# Patient Record
Sex: FEMALE | Race: WHITE | NOT HISPANIC OR LATINO | Employment: OTHER | URBAN - METROPOLITAN AREA
[De-identification: names, ages, dates, MRNs, and addresses within clinical notes are randomized per-mention and may not be internally consistent; named-entity substitution may affect disease eponyms.]

---

## 2023-04-04 ENCOUNTER — TELEPHONE (OUTPATIENT)
Dept: PRIMARY CARE | Facility: CLINIC | Age: 73
End: 2023-04-04
Payer: MEDICARE

## 2023-04-04 NOTE — TELEPHONE ENCOUNTER
Patient was instructed to get antibiotics before going to the dentist. Now  Dr. Rivera tells her she does not need antibiotics for just a cleaning. Please advise.

## 2023-04-06 ENCOUNTER — TELEPHONE (OUTPATIENT)
Dept: PRIMARY CARE | Facility: CLINIC | Age: 73
End: 2023-04-06
Payer: MEDICARE

## 2023-04-06 DIAGNOSIS — E11.65 TYPE 2 DIABETES MELLITUS WITH HYPERGLYCEMIA, WITHOUT LONG-TERM CURRENT USE OF INSULIN (MULTI): ICD-10-CM

## 2023-04-06 RX ORDER — METFORMIN HYDROCHLORIDE 500 MG/1
500 TABLET ORAL DAILY
COMMUNITY
End: 2023-04-06 | Stop reason: SDUPTHER

## 2023-04-06 RX ORDER — METFORMIN HYDROCHLORIDE 500 MG/1
500 TABLET ORAL DAILY
Qty: 90 TABLET | Refills: 1 | Status: SHIPPED | OUTPATIENT
Start: 2023-04-06 | End: 2023-04-28 | Stop reason: DRUGHIGH

## 2023-04-06 NOTE — TELEPHONE ENCOUNTER
Rx Refill Request Telephone Encounter    Name:  Irene Jett  :  [id number]  Medication Name:  Metformin  500mg  Route : oral  Frequency : once a day  90    Specific Pharmacy location:  [facility]  Date of last appointment:    Date of next appointment:    Best number to reach patient:  [phone]

## 2023-04-21 LAB
ALANINE AMINOTRANSFERASE (SGPT) (U/L) IN SER/PLAS: 14 U/L (ref 7–45)
ALBUMIN (G/DL) IN SER/PLAS: 4.3 G/DL (ref 3.4–5)
ALKALINE PHOSPHATASE (U/L) IN SER/PLAS: 56 U/L (ref 33–136)
ANION GAP IN SER/PLAS: 12 MMOL/L (ref 10–20)
ASPARTATE AMINOTRANSFERASE (SGOT) (U/L) IN SER/PLAS: 14 U/L (ref 9–39)
BILIRUBIN TOTAL (MG/DL) IN SER/PLAS: 0.6 MG/DL (ref 0–1.2)
CALCIUM (MG/DL) IN SER/PLAS: 9.4 MG/DL (ref 8.6–10.3)
CARBON DIOXIDE, TOTAL (MMOL/L) IN SER/PLAS: 28 MMOL/L (ref 21–32)
CHLORIDE (MMOL/L) IN SER/PLAS: 105 MMOL/L (ref 98–107)
CHOLESTEROL (MG/DL) IN SER/PLAS: 165 MG/DL (ref 0–199)
CHOLESTEROL IN HDL (MG/DL) IN SER/PLAS: 64.9 MG/DL
CHOLESTEROL IN LDL (MG/DL) IN SER/PLAS BY DIRECT ASSAY: 101 MG/DL (ref 0–129)
CHOLESTEROL/HDL RATIO: 2.5
CREATININE (MG/DL) IN SER/PLAS: 0.9 MG/DL (ref 0.5–1.05)
ERYTHROCYTE DISTRIBUTION WIDTH (RATIO) BY AUTOMATED COUNT: 12.8 % (ref 11.5–14.5)
ERYTHROCYTE MEAN CORPUSCULAR HEMOGLOBIN CONCENTRATION (G/DL) BY AUTOMATED: 32.9 G/DL (ref 32–36)
ERYTHROCYTE MEAN CORPUSCULAR VOLUME (FL) BY AUTOMATED COUNT: 92 FL (ref 80–100)
ERYTHROCYTES (10*6/UL) IN BLOOD BY AUTOMATED COUNT: 4.68 X10E12/L (ref 4–5.2)
ESTIMATED AVERAGE GLUCOSE FOR HBA1C: 174 MG/DL
GFR FEMALE: 67 ML/MIN/1.73M2
GLUCOSE (MG/DL) IN SER/PLAS: 135 MG/DL (ref 74–99)
HEMATOCRIT (%) IN BLOOD BY AUTOMATED COUNT: 43.1 % (ref 36–46)
HEMOGLOBIN (G/DL) IN BLOOD: 14.2 G/DL (ref 12–16)
HEMOGLOBIN A1C/HEMOGLOBIN TOTAL IN BLOOD: 7.7 %
LDL: 77 MG/DL (ref 0–99)
LEUKOCYTES (10*3/UL) IN BLOOD BY AUTOMATED COUNT: 6.6 X10E9/L (ref 4.4–11.3)
PLATELETS (10*3/UL) IN BLOOD AUTOMATED COUNT: 246 X10E9/L (ref 150–450)
POTASSIUM (MMOL/L) IN SER/PLAS: 3.6 MMOL/L (ref 3.5–5.3)
PROTEIN TOTAL: 7.1 G/DL (ref 6.4–8.2)
SODIUM (MMOL/L) IN SER/PLAS: 141 MMOL/L (ref 136–145)
THYROTROPIN (MIU/L) IN SER/PLAS BY DETECTION LIMIT <= 0.05 MIU/L: 2.64 MIU/L (ref 0.44–3.98)
TRIGLYCERIDE (MG/DL) IN SER/PLAS: 116 MG/DL (ref 0–149)
UREA NITROGEN (MG/DL) IN SER/PLAS: 15 MG/DL (ref 6–23)
VLDL: 23 MG/DL (ref 0–40)

## 2023-04-25 PROBLEM — M17.9 OSTEOARTHRITIS OF KNEE: Status: ACTIVE | Noted: 2023-04-25

## 2023-04-25 PROBLEM — M54.2 CHRONIC NECK PAIN: Status: ACTIVE | Noted: 2023-04-25

## 2023-04-25 PROBLEM — G89.29 CHRONIC NECK PAIN: Status: ACTIVE | Noted: 2023-04-25

## 2023-04-25 PROBLEM — Z00.00 MEDICARE ANNUAL WELLNESS VISIT, SUBSEQUENT: Status: ACTIVE | Noted: 2023-04-25

## 2023-04-25 PROBLEM — Z96.652 STATUS POST LEFT KNEE REPLACEMENT: Status: RESOLVED | Noted: 2023-04-25 | Resolved: 2023-04-25

## 2023-04-25 PROBLEM — G47.33 OSA ON CPAP: Status: ACTIVE | Noted: 2023-04-25

## 2023-04-25 PROBLEM — E78.1 ESSENTIAL HYPERTRIGLYCERIDEMIA: Status: ACTIVE | Noted: 2023-04-25

## 2023-04-25 PROBLEM — I10 ESSENTIAL HYPERTENSION: Status: ACTIVE | Noted: 2023-04-25

## 2023-04-25 PROBLEM — K21.9 GASTROESOPHAGEAL REFLUX DISEASE: Status: ACTIVE | Noted: 2023-04-25

## 2023-04-25 PROBLEM — R93.1 AGATSTON CAC SCORE, <100: Status: ACTIVE | Noted: 2023-04-25

## 2023-04-25 PROBLEM — K44.9 HIATAL HERNIA: Status: ACTIVE | Noted: 2023-04-25

## 2023-04-25 PROBLEM — N18.31 CHRONIC KIDNEY DISEASE, STAGE 3A (MULTI): Status: ACTIVE | Noted: 2023-04-25

## 2023-04-25 PROBLEM — E11.65 TYPE 2 DIABETES MELLITUS WITH HYPERGLYCEMIA, WITHOUT LONG-TERM CURRENT USE OF INSULIN (MULTI): Status: ACTIVE | Noted: 2023-04-25

## 2023-04-25 PROBLEM — M85.80 OSTEOPENIA: Status: ACTIVE | Noted: 2023-04-25

## 2023-04-25 RX ORDER — FAMOTIDINE 20 MG/1
1 TABLET, FILM COATED ORAL 2 TIMES DAILY PRN
COMMUNITY

## 2023-04-25 RX ORDER — CELECOXIB 200 MG/1
200 CAPSULE ORAL 2 TIMES DAILY PRN
COMMUNITY
Start: 2022-09-06 | End: 2023-08-28 | Stop reason: ALTCHOICE

## 2023-04-25 RX ORDER — HYDROCHLOROTHIAZIDE 12.5 MG/1
12.5 CAPSULE ORAL DAILY
COMMUNITY
End: 2023-08-28 | Stop reason: SDUPTHER

## 2023-04-25 RX ORDER — LISINOPRIL 40 MG/1
40 TABLET ORAL DAILY
COMMUNITY
End: 2023-08-28 | Stop reason: SDUPTHER

## 2023-04-25 NOTE — ASSESSMENT & PLAN NOTE
Improved on recheck and running 130s/70s at home per patient  Continue current medication   Reevaluate in 4 months.

## 2023-04-25 NOTE — ASSESSMENT & PLAN NOTE
Medical Wellness exam done.   Has received all appropriate COVID vaccinations.   Prevnar 13 11/16  Pneumovax 10/17  Mammogram 6/22  DEXA within normal limits 6/21  colonoscopy 4/13 - Cologuard ordered

## 2023-04-25 NOTE — ASSESSMENT & PLAN NOTE
GFR within normal limits most recent labs  ACR next labs  Continue to monitor   Reevaluate in 4 months.

## 2023-04-25 NOTE — PROGRESS NOTES
Subjective :  Chief Complaint: Irene Jett is an 73 y.o. female here for an annual Medicare Wellness visit, annual physical, and follow up labs and chronic conditions.    Has 1 week history of red, infected appearing toe. Thinks was rubbing on other toe while she was walking. No fever, spreading redness or skin breakdown. No history of neuropathy.    Patient otherwise feels well. No other complaints or concerns.    I have reviewed and reconciled the medication list with the patient today.    Current Outpatient Medications:     famotidine (Pepcid) 20 mg tablet, Take 1 tablet (20 mg) by mouth 2 times a day as needed for indigestion or heartburn., Disp: , Rfl:     hydroCHLOROthiazide (Microzide) 12.5 mg capsule, Take 1 capsule (12.5 mg) by mouth once daily., Disp: , Rfl:     lisinopril 40 mg tablet, Take 1 tablet (40 mg) by mouth once daily., Disp: , Rfl:     celecoxib (CeleBREX) 200 mg capsule, Take 1 capsule (200 mg) by mouth 2 times a day as needed., Disp: , Rfl:     cyanocobalamin, vitamin B-12, 200 mcg/spray spray,suspension, Place 2 sprays under the tongue once daily., Disp: , Rfl:     metFORMIN (Glucophage) 500 mg tablet, Take 1 tablet (500 mg) by mouth once daily with a meal., Disp: 180 tablet, Rfl: 3    semaglutide 0.25 mg or 0.5 mg (2 mg/3 mL) pen injector, Inject 0.25 mg under the skin 1 (one) time per week. May increase to 0.5 mg after first month, Disp: 2 mL, Rfl: 5    sulfamethoxazole-trimethoprim (Bactrim DS) 800-160 mg tablet, Take 1 tablet by mouth 2 times a day for 10 days., Disp: 20 tablet, Rfl: 0    The patient's relevant past medical, surgical, family and social history was reviewed in [facility].  All pertinent lab work and results for this visit were reviewed with patient.    Recent Results (from the past 1008 hour(s))   Hemoglobin A1C    Collection Time: 04/21/23 10:25 AM   Result Value Ref Range    Hemoglobin A1C 7.7 (A) %    Estimated Average Glucose 174 MG/DL   CBC    Collection Time: 04/21/23  10:25 AM   Result Value Ref Range    WBC 6.6 4.4 - 11.3 x10E9/L    RBC 4.68 4.00 - 5.20 x10E12/L    Hemoglobin 14.2 12.0 - 16.0 g/dL    Hematocrit 43.1 36.0 - 46.0 %    MCV 92 80 - 100 fL    MCHC 32.9 32.0 - 36.0 g/dL    Platelets 246 150 - 450 x10E9/L    RDW 12.8 11.5 - 14.5 %   Cholesterol, LDL Direct    Collection Time: 04/21/23 10:25 AM   Result Value Ref Range    LDL Direct 101 0 - 129 mg/dL   Comprehensive Metabolic Panel    Collection Time: 04/21/23 10:25 AM   Result Value Ref Range    Glucose 135 (H) 74 - 99 mg/dL    Sodium 141 136 - 145 mmol/L    Potassium 3.6 3.5 - 5.3 mmol/L    Chloride 105 98 - 107 mmol/L    Bicarbonate 28 21 - 32 mmol/L    Anion Gap 12 10 - 20 mmol/L    Urea Nitrogen 15 6 - 23 mg/dL    Creatinine 0.90 0.50 - 1.05 mg/dL    GFR Female 67 >90 mL/min/1.73m2    Calcium 9.4 8.6 - 10.3 mg/dL    Albumin 4.3 3.4 - 5.0 g/dL    Alkaline Phosphatase 56 33 - 136 U/L    Total Protein 7.1 6.4 - 8.2 g/dL    AST 14 9 - 39 U/L    Total Bilirubin 0.6 0.0 - 1.2 mg/dL    ALT (SGPT) 14 7 - 45 U/L   Lipid Panel    Collection Time: 04/21/23 10:25 AM   Result Value Ref Range    Cholesterol 165 0 - 199 mg/dL    HDL 64.9 mg/dL    Cholesterol/HDL Ratio 2.5     LDL 77 0 - 99 mg/dL    VLDL 23 0 - 40 mg/dL    Triglycerides 116 0 - 149 mg/dL   Thyroid Stimulating Hormone    Collection Time: 04/21/23 10:25 AM   Result Value Ref Range    TSH 2.64 0.44 - 3.98 mIU/L         Review of Systems   A complete review of systems was performed and all systems were normal except what is noted in the HPI.      List of current healthcare providers:  Patient Care Team:  Reanna Payne MD as PCP - General  Reanna Payne MD as PCP - [employer] PCP        Over the past 2 weeks, how often have you been bothered by any of the following problems?  Little interest or pleasure in doing things: Not at all  Feeling down, depressed, or hopeless: Not at all  Patient Health Questionnaire-2 Score: 0             Advance  Care Planning:    Living Will: Yes  POA: Yes    Objective :  /72   Pulse 66   Temp 35.6 °C (96 °F)   Wt 96.5 kg (212 lb 12.8 oz)   SpO2 98%   BMI 30.53 kg/m²    No results found.  Physical Exam  Constitutional:       Appearance: Normal appearance. She is obese.   HENT:      Head: Normocephalic and atraumatic.   Neck:      Vascular: No carotid bruit.   Cardiovascular:      Rate and Rhythm: Normal rate and regular rhythm.      Heart sounds: Normal heart sounds.   Pulmonary:      Effort: Pulmonary effort is normal.      Breath sounds: Normal breath sounds. No wheezing, rhonchi or rales.   Abdominal:      General: Abdomen is flat. Bowel sounds are normal.      Palpations: Abdomen is soft.      Tenderness: There is no abdominal tenderness. There is no guarding.   Musculoskeletal:         General: Normal range of motion.      Right lower leg: No edema.      Left lower leg: No edema.      Comments: Mild swelling and moderated redness and warmth distal right little toe  No drainage, numbness or skin breakdown   Skin:     General: Skin is dry.   Neurological:      General: No focal deficit present.      Mental Status: She is alert and oriented to person, place, and time.   Psychiatric:         Mood and Affect: Mood normal.         Behavior: Behavior normal.         Thought Content: Thought content normal.         Assessment/Plan :  Problem List Items Addressed This Visit       Chronic kidney disease, stage 3a     GFR within normal limits most recent labs  ACR next labs  Continue to monitor   Reevaluate in 4 months.           Relevant Orders    Comprehensive Metabolic Panel    Albumin , Urine Random    Essential hypertension     Improved on recheck and running 130s/70s at home per patient  Continue current medication   Reevaluate in 4 months.            Relevant Orders    Comprehensive Metabolic Panel    Essential hypertriglyceridemia     well controlled   continue work n diet  Reevaluate in 4 months.            Relevant Orders    Cholesterol, LDL Direct    Lipid Panel    Type 2 diabetes mellitus with hyperglycemia, without long-term current use of insulin (CMS/[facility])     A1c up from 6.7 to 7.7  Having diarrhea on BID metformin - decrease back to daily  Start ozempic as directed Risks, benefits and side effects reviewed with patient.   Work on diet reviewed with patient.   Reevaluate in 4 months.           Relevant Medications    metFORMIN (Glucophage) 500 mg tablet    semaglutide 0.25 mg or 0.5 mg (2 mg/3 mL) pen injector    Other Relevant Orders    Comprehensive Metabolic Panel    Albumin , Urine Random    Hemoglobin A1C    Medicare annual wellness visit, subsequent - Primary     Medical Wellness exam done.   Has received all appropriate COVID vaccinations.   Prevnar 13 11/16  Pneumovax 10/17  Mammogram 6/22  DEXA within normal limits 6/21  colonoscopy 4/13 - Cologuard ordered           Infection of toe     Start bactrim as directed Risks, benefits and side effects reviewed with patient.   Call for increased drainage, redness, warmth, fever, etc.   call if worsens or persists          Relevant Medications    sulfamethoxazole-trimethoprim (Bactrim DS) 800-160 mg tablet     Other Visit Diagnoses       Annual physical exam        Yearly physical done    Breast screening        Relevant Orders    BI mammo bilateral screening tomosynthesis    Encounter for hepatitis C screening test for low risk patient        Relevant Orders    Hepatitis C Antibody    Encounter for screening mammogram for malignant neoplasm of breast        Relevant Orders    BI mammo bilateral screening tomosynthesis    Encounter for screening for malignant neoplasm of colon        Relevant Orders    Cologuard® colon cancer screening               The following health maintenance schedule was reviewed with the patient and provided in printed form in the after visit summary:  Health Maintenance   Topic Date Due    Medicare Annual Wellness Visit (AWV)  Never  done    Colorectal Cancer Screening  Never done    Diabetes: Foot Exam  Never done    Diabetes: Retinopathy Screening  Never done    Hepatitis C Screening  Never done    DTaP/Tdap/Td Vaccines (1 - Tdap) Never done    Zoster Vaccines (1 of 2) Never done    COVID-19 Vaccine (5 - Booster) 04/01/2024 (Originally 2/16/2023)    Diabetes: Hemoglobin A1C  07/21/2023    Lipid Panel  04/21/2024    Influenza Vaccine  Completed    Pneumococcal Vaccine: 65+ Years  Completed    Bone Density Scan  Completed    HIB Vaccines  Aged Out    Hepatitis B Vaccines  Aged Out    IPV Vaccines  Aged Out    Hepatitis A Vaccines  Aged Out    Meningococcal Vaccine  Aged Out    Rotavirus Vaccines  Aged Out    HPV Vaccines  Aged Out           Patient understands and agrees with treatment plan.          Reanna Payne MD

## 2023-04-25 NOTE — PATIENT INSTRUCTIONS
I would like you to follow up in 4 months  Please have all labs that were ordered done at least 1 week prior to your visit.     Continue current medication.  Continue work on diet - recommend lots of fruits and vegetables, lean protein like chicken, turkey, fish, beans and Greek yogurt. Try to choose healthier carbohydrate options like oatmeal, wheat bread and pasta, sweet potatoes. Limit sugary treats.  Check a fasting sugar first thing in the AM twice weekly and keep a log of the results to bring to your next office visit.  Please contact office if your sugars are consistently >140.  Reevaluate in 4 months.

## 2023-04-25 NOTE — ASSESSMENT & PLAN NOTE
A1c up from 6.7 to 7.7  Having diarrhea on BID metformin - decrease back to daily  Start ozempic as directed Risks, benefits and side effects reviewed with patient.   Work on diet reviewed with patient.   Reevaluate in 4 months.

## 2023-04-28 ENCOUNTER — OFFICE VISIT (OUTPATIENT)
Dept: PRIMARY CARE | Facility: CLINIC | Age: 73
End: 2023-04-28
Payer: MEDICARE

## 2023-04-28 VITALS
OXYGEN SATURATION: 98 % | DIASTOLIC BLOOD PRESSURE: 72 MMHG | SYSTOLIC BLOOD PRESSURE: 138 MMHG | TEMPERATURE: 96 F | HEART RATE: 66 BPM | BODY MASS INDEX: 30.53 KG/M2 | WEIGHT: 212.8 LBS

## 2023-04-28 DIAGNOSIS — L08.9 INFECTION OF TOE: ICD-10-CM

## 2023-04-28 DIAGNOSIS — Z00.00 MEDICARE ANNUAL WELLNESS VISIT, SUBSEQUENT: Primary | ICD-10-CM

## 2023-04-28 DIAGNOSIS — E78.1 ESSENTIAL HYPERTRIGLYCERIDEMIA: ICD-10-CM

## 2023-04-28 DIAGNOSIS — Z12.31 ENCOUNTER FOR SCREENING MAMMOGRAM FOR MALIGNANT NEOPLASM OF BREAST: ICD-10-CM

## 2023-04-28 DIAGNOSIS — E11.65 TYPE 2 DIABETES MELLITUS WITH HYPERGLYCEMIA, WITHOUT LONG-TERM CURRENT USE OF INSULIN (MULTI): ICD-10-CM

## 2023-04-28 DIAGNOSIS — Z11.59 ENCOUNTER FOR HEPATITIS C SCREENING TEST FOR LOW RISK PATIENT: ICD-10-CM

## 2023-04-28 DIAGNOSIS — I10 ESSENTIAL HYPERTENSION: ICD-10-CM

## 2023-04-28 DIAGNOSIS — Z12.39 BREAST SCREENING: ICD-10-CM

## 2023-04-28 DIAGNOSIS — N18.31 CHRONIC KIDNEY DISEASE, STAGE 3A (MULTI): ICD-10-CM

## 2023-04-28 DIAGNOSIS — Z12.11 ENCOUNTER FOR SCREENING FOR MALIGNANT NEOPLASM OF COLON: ICD-10-CM

## 2023-04-28 DIAGNOSIS — Z00.00 ANNUAL PHYSICAL EXAM: ICD-10-CM

## 2023-04-28 PROBLEM — G47.33 OSA ON CPAP: Status: RESOLVED | Noted: 2023-04-25 | Resolved: 2023-04-28

## 2023-04-28 PROCEDURE — G0439 PPPS, SUBSEQ VISIT: HCPCS | Performed by: FAMILY MEDICINE

## 2023-04-28 PROCEDURE — 3051F HG A1C>EQUAL 7.0%<8.0%: CPT | Performed by: FAMILY MEDICINE

## 2023-04-28 PROCEDURE — 4010F ACE/ARB THERAPY RXD/TAKEN: CPT | Performed by: FAMILY MEDICINE

## 2023-04-28 PROCEDURE — 1160F RVW MEDS BY RX/DR IN RCRD: CPT | Performed by: FAMILY MEDICINE

## 2023-04-28 PROCEDURE — 1036F TOBACCO NON-USER: CPT | Performed by: FAMILY MEDICINE

## 2023-04-28 PROCEDURE — 3078F DIAST BP <80 MM HG: CPT | Performed by: FAMILY MEDICINE

## 2023-04-28 PROCEDURE — 3075F SYST BP GE 130 - 139MM HG: CPT | Performed by: FAMILY MEDICINE

## 2023-04-28 PROCEDURE — 1170F FXNL STATUS ASSESSED: CPT | Performed by: FAMILY MEDICINE

## 2023-04-28 PROCEDURE — 3066F NEPHROPATHY DOC TX: CPT | Performed by: FAMILY MEDICINE

## 2023-04-28 PROCEDURE — 99397 PER PM REEVAL EST PAT 65+ YR: CPT | Performed by: FAMILY MEDICINE

## 2023-04-28 PROCEDURE — 99214 OFFICE O/P EST MOD 30 MIN: CPT | Performed by: FAMILY MEDICINE

## 2023-04-28 PROCEDURE — 1157F ADVNC CARE PLAN IN RCRD: CPT | Performed by: FAMILY MEDICINE

## 2023-04-28 PROCEDURE — 1159F MED LIST DOCD IN RCRD: CPT | Performed by: FAMILY MEDICINE

## 2023-04-28 RX ORDER — METFORMIN HYDROCHLORIDE 500 MG/1
500 TABLET ORAL 2 TIMES DAILY
Qty: 180 TABLET | Refills: 3 | Status: SHIPPED | OUTPATIENT
Start: 2023-04-28 | End: 2023-04-28 | Stop reason: DRUGHIGH

## 2023-04-28 RX ORDER — METFORMIN HYDROCHLORIDE 500 MG/1
500 TABLET ORAL
Qty: 180 TABLET | Refills: 3
Start: 2023-04-28 | End: 2023-08-28 | Stop reason: SDUPTHER

## 2023-04-28 RX ORDER — SULFAMETHOXAZOLE AND TRIMETHOPRIM 800; 160 MG/1; MG/1
1 TABLET ORAL 2 TIMES DAILY
Qty: 20 TABLET | Refills: 0 | Status: SHIPPED | OUTPATIENT
Start: 2023-04-28 | End: 2023-05-08

## 2023-04-28 ASSESSMENT — ACTIVITIES OF DAILY LIVING (ADL)
DOING_HOUSEWORK: INDEPENDENT
GROCERY_SHOPPING: INDEPENDENT
MANAGING_FINANCES: INDEPENDENT
TAKING_MEDICATION: INDEPENDENT
DRESSING: INDEPENDENT
BATHING: INDEPENDENT

## 2023-04-28 ASSESSMENT — PATIENT HEALTH QUESTIONNAIRE - PHQ9
SUM OF ALL RESPONSES TO PHQ9 QUESTIONS 1 AND 2: 0
1. LITTLE INTEREST OR PLEASURE IN DOING THINGS: NOT AT ALL
2. FEELING DOWN, DEPRESSED OR HOPELESS: NOT AT ALL

## 2023-04-28 ASSESSMENT — ENCOUNTER SYMPTOMS
LOSS OF SENSATION IN FEET: 0
OCCASIONAL FEELINGS OF UNSTEADINESS: 0
DEPRESSION: 0

## 2023-04-28 NOTE — ASSESSMENT & PLAN NOTE
Start bactrim as directed Risks, benefits and side effects reviewed with patient.   Call for increased drainage, redness, warmth, fever, etc.   call if worsens or persists

## 2023-05-09 ENCOUNTER — TELEPHONE (OUTPATIENT)
Dept: PRIMARY CARE | Facility: CLINIC | Age: 73
End: 2023-05-09
Payer: MEDICARE

## 2023-05-09 NOTE — TELEPHONE ENCOUNTER
Patient called in and stated that she is having side effects from the Ozempic.    Constipation, the patient has been taking fiber gummies that has been helping.   GERD, the patient has been taking Famoidine. The patient took some tums and a little baking soda with water.     The patient would like to know should she continue on the Ozempic?

## 2023-05-15 LAB — NONINV COLON CA DNA+OCC BLD SCRN STL QL: NEGATIVE

## 2023-05-24 ENCOUNTER — TELEPHONE (OUTPATIENT)
Dept: PRIMARY CARE | Facility: CLINIC | Age: 73
End: 2023-05-24
Payer: MEDICARE

## 2023-05-24 DIAGNOSIS — E11.65 TYPE 2 DIABETES MELLITUS WITH HYPERGLYCEMIA, WITHOUT LONG-TERM CURRENT USE OF INSULIN (MULTI): ICD-10-CM

## 2023-05-24 NOTE — TELEPHONE ENCOUNTER
Irene called today to find out if she is supposed to increase her Ozempic from 0.25mg to .50mg .  Her 5th dose is due on Friday.

## 2023-08-21 ENCOUNTER — LAB (OUTPATIENT)
Dept: LAB | Facility: LAB | Age: 73
End: 2023-08-21
Payer: MEDICARE

## 2023-08-21 DIAGNOSIS — Z11.59 ENCOUNTER FOR HEPATITIS C SCREENING TEST FOR LOW RISK PATIENT: ICD-10-CM

## 2023-08-21 DIAGNOSIS — E11.65 TYPE 2 DIABETES MELLITUS WITH HYPERGLYCEMIA, WITHOUT LONG-TERM CURRENT USE OF INSULIN (MULTI): ICD-10-CM

## 2023-08-21 DIAGNOSIS — N18.31 CHRONIC KIDNEY DISEASE, STAGE 3A (MULTI): ICD-10-CM

## 2023-08-21 DIAGNOSIS — E78.1 ESSENTIAL HYPERTRIGLYCERIDEMIA: ICD-10-CM

## 2023-08-21 DIAGNOSIS — I10 ESSENTIAL HYPERTENSION: ICD-10-CM

## 2023-08-21 LAB
ALANINE AMINOTRANSFERASE (SGPT) (U/L) IN SER/PLAS: 14 U/L (ref 7–45)
ALBUMIN (G/DL) IN SER/PLAS: 4.4 G/DL (ref 3.4–5)
ALBUMIN (MG/L) IN URINE: <7 MG/L
ALBUMIN/CREATININE (UG/MG) IN URINE: NORMAL UG/MG CRT (ref 0–30)
ALKALINE PHOSPHATASE (U/L) IN SER/PLAS: 52 U/L (ref 33–136)
ANION GAP IN SER/PLAS: 11 MMOL/L (ref 10–20)
ASPARTATE AMINOTRANSFERASE (SGOT) (U/L) IN SER/PLAS: 16 U/L (ref 9–39)
BILIRUBIN TOTAL (MG/DL) IN SER/PLAS: 0.6 MG/DL (ref 0–1.2)
CALCIUM (MG/DL) IN SER/PLAS: 10 MG/DL (ref 8.6–10.3)
CARBON DIOXIDE, TOTAL (MMOL/L) IN SER/PLAS: 28 MMOL/L (ref 21–32)
CHLORIDE (MMOL/L) IN SER/PLAS: 104 MMOL/L (ref 98–107)
CHOLESTEROL (MG/DL) IN SER/PLAS: 173 MG/DL (ref 0–199)
CHOLESTEROL IN HDL (MG/DL) IN SER/PLAS: 54.8 MG/DL
CHOLESTEROL IN LDL (MG/DL) IN SER/PLAS BY DIRECT ASSAY: 96 MG/DL (ref 0–129)
CHOLESTEROL/HDL RATIO: 3.2
CREATININE (MG/DL) IN SER/PLAS: 0.94 MG/DL (ref 0.5–1.05)
CREATININE (MG/DL) IN URINE: 74.4 MG/DL (ref 20–320)
ESTIMATED AVERAGE GLUCOSE FOR HBA1C: 146 MG/DL
GFR FEMALE: 63 ML/MIN/1.73M2
GLUCOSE (MG/DL) IN SER/PLAS: 135 MG/DL (ref 74–99)
HEMOGLOBIN A1C/HEMOGLOBIN TOTAL IN BLOOD: 6.7 %
HEPATITIS C VIRUS AB PRESENCE IN SERUM: NONREACTIVE
LDL: 78 MG/DL (ref 0–99)
NON HDL CHOLESTEROL: 118 MG/DL
POTASSIUM (MMOL/L) IN SER/PLAS: 4.2 MMOL/L (ref 3.5–5.3)
PROTEIN TOTAL: 6.8 G/DL (ref 6.4–8.2)
SODIUM (MMOL/L) IN SER/PLAS: 139 MMOL/L (ref 136–145)
TRIGLYCERIDE (MG/DL) IN SER/PLAS: 203 MG/DL (ref 0–149)
UREA NITROGEN (MG/DL) IN SER/PLAS: 14 MG/DL (ref 6–23)
VLDL: 41 MG/DL (ref 0–40)

## 2023-08-21 PROCEDURE — 83721 ASSAY OF BLOOD LIPOPROTEIN: CPT

## 2023-08-21 PROCEDURE — 86803 HEPATITIS C AB TEST: CPT

## 2023-08-21 PROCEDURE — 36415 COLL VENOUS BLD VENIPUNCTURE: CPT

## 2023-08-21 PROCEDURE — 80053 COMPREHEN METABOLIC PANEL: CPT

## 2023-08-21 PROCEDURE — 80061 LIPID PANEL: CPT

## 2023-08-21 PROCEDURE — 82570 ASSAY OF URINE CREATININE: CPT

## 2023-08-21 PROCEDURE — 83036 HEMOGLOBIN GLYCOSYLATED A1C: CPT

## 2023-08-21 PROCEDURE — 82043 UR ALBUMIN QUANTITATIVE: CPT

## 2023-08-27 PROBLEM — L08.9 INFECTION OF TOE: Status: RESOLVED | Noted: 2023-04-28 | Resolved: 2023-08-27

## 2023-08-27 NOTE — PROGRESS NOTES
Subjective   Patient ID: Irene Jett is a 73 y.o. female who presents for Follow-up.    Diabetes  She has type 2 diabetes mellitus. No MedicAlert identification noted. Pertinent negatives for hypoglycemia include no confusion, dizziness, headaches, mood changes, nervousness/anxiousness, pallor, seizures, sleepiness, speech difficulty, sweats or tremors. Pertinent negatives for diabetes include no blurred vision, no chest pain, no fatigue, no foot paresthesias, no foot ulcerations, no polydipsia, no polyphagia, no polyuria, no visual change, no weakness and no weight loss. Pertinent negatives for hypoglycemia complications include no blackouts, no hospitalization, no nocturnal hypoglycemia, no required assistance and no required glucagon injection. Symptoms are improving. Pertinent negatives for diabetic complications include no CVA, heart disease, impotence, nephropathy, peripheral neuropathy or retinopathy. Risk factors for coronary artery disease include family history, hypertension, obesity and post-menopausal. Current diabetic treatment includes insulin injections and oral agent (dual therapy). She is compliant with treatment all of the time. Her weight is decreasing steadily. She is following a generally healthy diet. Meal planning includes avoidance of concentrated sweets. She has not had a previous visit with a dietitian. She participates in exercise daily. She monitors blood glucose at home 1-2 x per week. She monitors urine at home <1 x per month. Blood glucose monitoring compliance is adequate. Her home blood glucose trend is decreasing steadily. Her overall blood glucose range is 140-180 mg/dl. She sees a podiatrist.Eye exam is current.     Patient presents today for follow up labs and chronic conditions.  Some constipation on ozempic - using Metamucil.  Patient otherwise feels well. No other complaints or concerns.    The patient's relevant past medical, surgical, family, and social history was  reviewed in [facility].  All pertinent lab work and results for this visit were reviewed with patient.    Recent Results (from the past 1008 hour(s))   Cholesterol, LDL Direct    Collection Time: 08/21/23  7:51 AM   Result Value Ref Range    LDL Direct 96 0 - 129 mg/dL   Lipid Panel    Collection Time: 08/21/23  7:51 AM   Result Value Ref Range    Cholesterol 173 0 - 199 mg/dL    HDL 54.8 mg/dL    Cholesterol/HDL Ratio 3.2     LDL 78 0 - 99 mg/dL    VLDL 41 (H) 0 - 40 mg/dL    Triglycerides 203 (H) 0 - 149 mg/dL    Non HDL Cholesterol 118 mg/dL   Comprehensive Metabolic Panel    Collection Time: 08/21/23  7:51 AM   Result Value Ref Range    Glucose 135 (H) 74 - 99 mg/dL    Sodium 139 136 - 145 mmol/L    Potassium 4.2 3.5 - 5.3 mmol/L    Chloride 104 98 - 107 mmol/L    Bicarbonate 28 21 - 32 mmol/L    Anion Gap 11 10 - 20 mmol/L    Urea Nitrogen 14 6 - 23 mg/dL    Creatinine 0.94 0.50 - 1.05 mg/dL    GFR Female 63 >90 mL/min/1.73m2    Calcium 10.0 8.6 - 10.3 mg/dL    Albumin 4.4 3.4 - 5.0 g/dL    Alkaline Phosphatase 52 33 - 136 U/L    Total Protein 6.8 6.4 - 8.2 g/dL    AST 16 9 - 39 U/L    Total Bilirubin 0.6 0.0 - 1.2 mg/dL    ALT (SGPT) 14 7 - 45 U/L   Albumin , Urine Random    Collection Time: 08/21/23  7:51 AM   Result Value Ref Range    ALBUMIN (MG/L) IN URINE <7.0 Not Established mg/L    Albumin/Creatine Ratio SEE COMMENT 0.0 - 30.0 ug/mg crt    Creatinine, Urine 74.4 20.0 - 320.0 mg/dL   Hemoglobin A1C    Collection Time: 08/21/23  7:51 AM   Result Value Ref Range    Hemoglobin A1C 6.7 (A) %    Estimated Average Glucose 146 MG/DL   Hepatitis C Antibody    Collection Time: 08/21/23  7:51 AM   Result Value Ref Range    Hepatitis C Ab NONREACTIVE NONREACTIVE           Review of Systems   Constitutional:  Negative for fatigue and weight loss.   Eyes:  Negative for blurred vision.   Cardiovascular:  Negative for chest pain.   Endocrine: Negative for polydipsia, polyphagia and polyuria.   Genitourinary:  Negative for  "impotence.   Skin:  Negative for pallor.   Neurological:  Negative for dizziness, tremors, seizures, speech difficulty, weakness and headaches.   Psychiatric/Behavioral:  Negative for confusion. The patient is not nervous/anxious.       A complete review of systems was performed and all systems were normal except what is noted in the HPI.        Objective   /88   Pulse 72   Temp 36.1 °C (97 °F)   Ht 1.778 m (5' 10\")   Wt 93.4 kg (206 lb)   SpO2 97%   BMI 29.56 kg/m²    Physical Exam  Constitutional:       Appearance: Normal appearance.   HENT:      Head: Normocephalic and atraumatic.   Neck:      Vascular: No carotid bruit.   Cardiovascular:      Rate and Rhythm: Normal rate and regular rhythm.      Heart sounds: Normal heart sounds.   Pulmonary:      Effort: Pulmonary effort is normal.      Breath sounds: Normal breath sounds. No wheezing, rhonchi or rales.   Abdominal:      General: Abdomen is flat. Bowel sounds are normal.      Palpations: Abdomen is soft.      Tenderness: There is no abdominal tenderness. There is no guarding.   Musculoskeletal:         General: Normal range of motion.      Right lower leg: No edema.      Left lower leg: No edema.   Skin:     General: Skin is dry.   Neurological:      General: No focal deficit present.      Mental Status: She is alert and oriented to person, place, and time.   Psychiatric:         Mood and Affect: Mood normal.         Behavior: Behavior normal.         Thought Content: Thought content normal.         Health Maintenance Due   Topic Date Due    Diabetes: Foot Exam  Never done    Diabetes: Retinopathy Screening  Never done    DTaP/Tdap/Td Vaccines (1 - Tdap) Never done    Zoster Vaccines (1 of 2) Never done        Assessment/Plan   Problem List Items Addressed This Visit       Chronic kidney disease, stage 3a (CMS/HCC)     Stable and without proteinuria   On ACE  Reevaluate in 4 months.           Relevant Orders    CBC    Comprehensive Metabolic Panel "    Essential hypertension     Borderline in office but better at home per patient  Continue current medication  Continue to monitor - call if consistently >140/90  Reevaluate in 6 months.            Relevant Medications    lisinopril 40 mg tablet    Other Relevant Orders    TSH with reflex to Free T4 if abnormal    Comprehensive Metabolic Panel    Essential hypertriglyceridemia     triglycerides up - Work on diet reviewed with patient.   Recheck 6 months         Relevant Orders    TSH with reflex to Free T4 if abnormal    Lipid Panel    Cholesterol, LDL Direct    Type 2 diabetes mellitus with hyperglycemia, without long-term current use of insulin (CMS/[facility]) - Primary     A1c improved from 7.7 to 6.7 on Ozempic  Reevaluate in 6 months.           Relevant Medications    metFORMIN (Glucophage) 500 mg tablet    Other Relevant Orders    TSH with reflex to Free T4 if abnormal    Comprehensive Metabolic Panel    Hemoglobin A1C    Vitamin B12     Other Visit Diagnoses       Lipoma of left lower extremity        Relevant Orders    Referral to General Surgery              Patient understands and agrees with care plan.           Reanna Payne MD

## 2023-08-27 NOTE — PATIENT INSTRUCTIONS
I would like you to follow up in 6 months  Please have all labs that were ordered done at least 1 week prior to your visit.    Continue current medication for hypertension. Continue to monitor blood pressure.  Call if blood pressure consistently >140/90.  Low salt diet recommended.  Increase physical activity as able.  Reevaluate in 6 months.

## 2023-08-27 NOTE — ASSESSMENT & PLAN NOTE
Borderline in office but better at home per patient  Continue current medication  Continue to monitor - call if consistently >140/90  Reevaluate in 6 months.

## 2023-08-28 ENCOUNTER — OFFICE VISIT (OUTPATIENT)
Dept: PRIMARY CARE | Facility: CLINIC | Age: 73
End: 2023-08-28
Payer: MEDICARE

## 2023-08-28 VITALS
BODY MASS INDEX: 29.49 KG/M2 | SYSTOLIC BLOOD PRESSURE: 138 MMHG | TEMPERATURE: 97 F | DIASTOLIC BLOOD PRESSURE: 88 MMHG | HEIGHT: 70 IN | OXYGEN SATURATION: 97 % | HEART RATE: 72 BPM | WEIGHT: 206 LBS

## 2023-08-28 DIAGNOSIS — N18.31 CHRONIC KIDNEY DISEASE, STAGE 3A (MULTI): ICD-10-CM

## 2023-08-28 DIAGNOSIS — E11.65 TYPE 2 DIABETES MELLITUS WITH HYPERGLYCEMIA, WITHOUT LONG-TERM CURRENT USE OF INSULIN (MULTI): Primary | ICD-10-CM

## 2023-08-28 DIAGNOSIS — E78.1 ESSENTIAL HYPERTRIGLYCERIDEMIA: ICD-10-CM

## 2023-08-28 DIAGNOSIS — I10 ESSENTIAL HYPERTENSION: ICD-10-CM

## 2023-08-28 DIAGNOSIS — D17.24 LIPOMA OF LEFT LOWER EXTREMITY: ICD-10-CM

## 2023-08-28 PROCEDURE — 1160F RVW MEDS BY RX/DR IN RCRD: CPT | Performed by: FAMILY MEDICINE

## 2023-08-28 PROCEDURE — 3079F DIAST BP 80-89 MM HG: CPT | Performed by: FAMILY MEDICINE

## 2023-08-28 PROCEDURE — 3044F HG A1C LEVEL LT 7.0%: CPT | Performed by: FAMILY MEDICINE

## 2023-08-28 PROCEDURE — 1159F MED LIST DOCD IN RCRD: CPT | Performed by: FAMILY MEDICINE

## 2023-08-28 PROCEDURE — 99214 OFFICE O/P EST MOD 30 MIN: CPT | Performed by: FAMILY MEDICINE

## 2023-08-28 PROCEDURE — 1036F TOBACCO NON-USER: CPT | Performed by: FAMILY MEDICINE

## 2023-08-28 PROCEDURE — 3075F SYST BP GE 130 - 139MM HG: CPT | Performed by: FAMILY MEDICINE

## 2023-08-28 PROCEDURE — 4010F ACE/ARB THERAPY RXD/TAKEN: CPT | Performed by: FAMILY MEDICINE

## 2023-08-28 RX ORDER — HYDROCHLOROTHIAZIDE 12.5 MG/1
12.5 CAPSULE ORAL DAILY
Qty: 90 CAPSULE | Refills: 3 | Status: SHIPPED | OUTPATIENT
Start: 2023-08-28 | End: 2024-04-15 | Stop reason: SDUPTHER

## 2023-08-28 RX ORDER — LISINOPRIL 40 MG/1
40 TABLET ORAL DAILY
Qty: 90 TABLET | Refills: 3 | Status: SHIPPED | OUTPATIENT
Start: 2023-08-28 | End: 2024-08-27

## 2023-08-28 RX ORDER — METFORMIN HYDROCHLORIDE 500 MG/1
500 TABLET ORAL
Qty: 90 TABLET | Refills: 3 | Status: SHIPPED | OUTPATIENT
Start: 2023-08-28 | End: 2024-05-21 | Stop reason: SDUPTHER

## 2023-08-28 ASSESSMENT — ENCOUNTER SYMPTOMS
TREMORS: 0
SWEATS: 0
HEADACHES: 0
POLYDIPSIA: 0
NERVOUS/ANXIOUS: 0
SPEECH DIFFICULTY: 0
WEIGHT LOSS: 0
VISUAL CHANGE: 0
FATIGUE: 0
BLURRED VISION: 0
POLYPHAGIA: 0
DIZZINESS: 0
BLACKOUTS: 0
WEAKNESS: 0
CONFUSION: 0
SEIZURES: 0

## 2023-10-25 ENCOUNTER — PHARMACY VISIT (OUTPATIENT)
Dept: PHARMACY | Facility: CLINIC | Age: 73
End: 2023-10-25
Payer: COMMERCIAL

## 2023-10-25 PROCEDURE — RXMED WILLOW AMBULATORY MEDICATION CHARGE

## 2023-11-03 ENCOUNTER — TELEPHONE (OUTPATIENT)
Dept: PRIMARY CARE | Facility: CLINIC | Age: 73
End: 2023-11-03
Payer: MEDICARE

## 2023-11-03 DIAGNOSIS — E11.65 TYPE 2 DIABETES MELLITUS WITH HYPERGLYCEMIA, WITHOUT LONG-TERM CURRENT USE OF INSULIN (MULTI): ICD-10-CM

## 2023-11-03 NOTE — TELEPHONE ENCOUNTER
Rx Refill Request Telephone Encounter    Name:  Irene Jett  :  [id number]  Medication Name:      semaglutide 0.25 mg or 0.5 mg (2 mg/3 mL) pen injector [[id number]]  inject 0.5mg under the skin 1 time per week                   Specific Pharmacy location:  [facility]  Date of last appointment:  2023  Date of next appointment:  2024  Best number to reach patient:  [phone]

## 2023-11-15 ENCOUNTER — TELEPHONE (OUTPATIENT)
Dept: PRIMARY CARE | Facility: CLINIC | Age: 73
End: 2023-11-15
Payer: MEDICARE

## 2023-11-15 DIAGNOSIS — E11.65 TYPE 2 DIABETES MELLITUS WITH HYPERGLYCEMIA, WITHOUT LONG-TERM CURRENT USE OF INSULIN (MULTI): ICD-10-CM

## 2023-11-15 NOTE — TELEPHONE ENCOUNTER
Rx Refill Request Telephone Encounter  Name:  Irene Jett  :  [id number]  Medication Name:       semaglutide 0.25 mg or 0.5 mg (2 mg/3 mL) pen injector [[id number]]  inject 0.5mg under the skin 1 time per week           I am not seeing the receipt conformation for this medication                 Specific Pharmacy location:  [facility]  Date of last appointment:  2023  Date of next appointment:  2024  Best number to reach patient:  [phone]

## 2023-11-17 ENCOUNTER — PHARMACY VISIT (OUTPATIENT)
Dept: PHARMACY | Facility: CLINIC | Age: 73
End: 2023-11-17
Payer: COMMERCIAL

## 2023-11-17 ENCOUNTER — TELEPHONE (OUTPATIENT)
Dept: PRIMARY CARE | Facility: CLINIC | Age: 73
End: 2023-11-17
Payer: MEDICARE

## 2023-11-17 DIAGNOSIS — E11.65 TYPE 2 DIABETES MELLITUS WITH HYPERGLYCEMIA, WITHOUT LONG-TERM CURRENT USE OF INSULIN (MULTI): ICD-10-CM

## 2023-11-17 DIAGNOSIS — E11.65 TYPE 2 DIABETES MELLITUS WITH HYPERGLYCEMIA, WITHOUT LONG-TERM CURRENT USE OF INSULIN (MULTI): Primary | ICD-10-CM

## 2023-11-17 NOTE — TELEPHONE ENCOUNTER
Patient had the prescription for Qzempic sent from [facility] to the [facility] in [place]. She may not be able to get the medication for a couple of weeks.   What are your instructions for her if she cannot get the Ozempic on time? Should she double up on her metformin?    Please advise .  She took last dose this morning.    Please answer via My chart if able.

## 2023-11-17 NOTE — TELEPHONE ENCOUNTER
I called the patient's pharmacy and they stated this medication was last picked up 10/15/23 and the patient still has one refill on file.

## 2023-12-04 NOTE — TELEPHONE ENCOUNTER
Patient requesting a refill on the ozempic be sent to the [facility]. She still has rybelsus left at the moment.

## 2023-12-05 ENCOUNTER — PHARMACY VISIT (OUTPATIENT)
Dept: PHARMACY | Facility: CLINIC | Age: 73
End: 2023-12-05
Payer: COMMERCIAL

## 2023-12-05 PROCEDURE — RXMED WILLOW AMBULATORY MEDICATION CHARGE

## 2024-01-02 ENCOUNTER — PHARMACY VISIT (OUTPATIENT)
Dept: PHARMACY | Facility: CLINIC | Age: 74
End: 2024-01-02
Payer: MEDICARE

## 2024-01-02 PROCEDURE — RXMED WILLOW AMBULATORY MEDICATION CHARGE

## 2024-01-29 ENCOUNTER — TELEPHONE (OUTPATIENT)
Dept: PRIMARY CARE | Facility: CLINIC | Age: 74
End: 2024-01-29
Payer: MEDICARE

## 2024-01-29 DIAGNOSIS — M25.50 ARTHRALGIA, UNSPECIFIED JOINT: Primary | ICD-10-CM

## 2024-01-29 NOTE — TELEPHONE ENCOUNTER
Patient called in with complaints of on-going headaches., muscle and joint pain, and fatigue. She is requesting a blood test to test her for lyme disease as she reported she had been bitten by 3+ ticks over the summer and just wants to rule out this possibility. Please advise.

## 2024-02-23 ENCOUNTER — LAB (OUTPATIENT)
Dept: LAB | Facility: LAB | Age: 74
End: 2024-02-23
Payer: MEDICARE

## 2024-02-23 DIAGNOSIS — E78.1 ESSENTIAL HYPERTRIGLYCERIDEMIA: ICD-10-CM

## 2024-02-23 DIAGNOSIS — N18.31 CHRONIC KIDNEY DISEASE, STAGE 3A (MULTI): ICD-10-CM

## 2024-02-23 DIAGNOSIS — E11.65 TYPE 2 DIABETES MELLITUS WITH HYPERGLYCEMIA, WITHOUT LONG-TERM CURRENT USE OF INSULIN (MULTI): ICD-10-CM

## 2024-02-23 DIAGNOSIS — I10 ESSENTIAL HYPERTENSION: ICD-10-CM

## 2024-02-23 DIAGNOSIS — M25.50 ARTHRALGIA, UNSPECIFIED JOINT: ICD-10-CM

## 2024-02-23 LAB
ALBUMIN SERPL BCP-MCNC: 4.2 G/DL (ref 3.4–5)
ALP SERPL-CCNC: 54 U/L (ref 33–136)
ALT SERPL W P-5'-P-CCNC: 16 U/L (ref 7–45)
ANION GAP SERPL CALC-SCNC: 15 MMOL/L (ref 10–20)
AST SERPL W P-5'-P-CCNC: 15 U/L (ref 9–39)
BILIRUB SERPL-MCNC: 0.6 MG/DL (ref 0–1.2)
BUN SERPL-MCNC: 16 MG/DL (ref 6–23)
CALCIUM SERPL-MCNC: 9.7 MG/DL (ref 8.6–10.3)
CHLORIDE SERPL-SCNC: 102 MMOL/L (ref 98–107)
CHOLEST SERPL-MCNC: 166 MG/DL (ref 0–199)
CHOLESTEROL/HDL RATIO: 2.7
CO2 SERPL-SCNC: 25 MMOL/L (ref 21–32)
CREAT SERPL-MCNC: 0.94 MG/DL (ref 0.5–1.05)
EGFRCR SERPLBLD CKD-EPI 2021: 63 ML/MIN/1.73M*2
ERYTHROCYTE [DISTWIDTH] IN BLOOD BY AUTOMATED COUNT: 12.5 % (ref 11.5–14.5)
EST. AVERAGE GLUCOSE BLD GHB EST-MCNC: 157 MG/DL
GLUCOSE SERPL-MCNC: 160 MG/DL (ref 74–99)
HBA1C MFR BLD: 7.1 %
HCT VFR BLD AUTO: 42.6 % (ref 36–46)
HDLC SERPL-MCNC: 60.6 MG/DL
HGB BLD-MCNC: 14 G/DL (ref 12–16)
LDLC SERPL CALC-MCNC: 69 MG/DL
LDLC SERPL DIRECT ASSAY-MCNC: 87 MG/DL (ref 0–129)
MCH RBC QN AUTO: 30.4 PG (ref 26–34)
MCHC RBC AUTO-ENTMCNC: 32.9 G/DL (ref 32–36)
MCV RBC AUTO: 93 FL (ref 80–100)
NON HDL CHOLESTEROL: 105 MG/DL (ref 0–149)
NRBC BLD-RTO: 0 /100 WBCS (ref 0–0)
PLATELET # BLD AUTO: 259 X10*3/UL (ref 150–450)
POTASSIUM SERPL-SCNC: 3.7 MMOL/L (ref 3.5–5.3)
PROT SERPL-MCNC: 7.1 G/DL (ref 6.4–8.2)
RBC # BLD AUTO: 4.6 X10*6/UL (ref 4–5.2)
SODIUM SERPL-SCNC: 138 MMOL/L (ref 136–145)
T4 FREE SERPL-MCNC: 0.77 NG/DL (ref 0.61–1.12)
TRIGL SERPL-MCNC: 182 MG/DL (ref 0–149)
TSH SERPL-ACNC: 4.08 MIU/L (ref 0.44–3.98)
VIT B12 SERPL-MCNC: 1491 PG/ML (ref 211–911)
VLDL: 36 MG/DL (ref 0–40)
WBC # BLD AUTO: 7.7 X10*3/UL (ref 4.4–11.3)

## 2024-02-23 PROCEDURE — 80061 LIPID PANEL: CPT

## 2024-02-23 PROCEDURE — 84439 ASSAY OF FREE THYROXINE: CPT

## 2024-02-23 PROCEDURE — 83036 HEMOGLOBIN GLYCOSYLATED A1C: CPT

## 2024-02-23 PROCEDURE — 84443 ASSAY THYROID STIM HORMONE: CPT

## 2024-02-23 PROCEDURE — 82607 VITAMIN B-12: CPT

## 2024-02-23 PROCEDURE — 85027 COMPLETE CBC AUTOMATED: CPT

## 2024-02-23 PROCEDURE — 80053 COMPREHEN METABOLIC PANEL: CPT

## 2024-02-23 PROCEDURE — 83721 ASSAY OF BLOOD LIPOPROTEIN: CPT

## 2024-02-26 PROBLEM — R79.89 ELEVATED TSH: Status: ACTIVE | Noted: 2024-02-26

## 2024-02-26 LAB
B BURGDOR DNA SPEC QL NAA+PROBE: NOT DETECTED
SPECIMEN SOURCE: NORMAL

## 2024-02-26 NOTE — ASSESSMENT & PLAN NOTE
A1c up a bit from 6.7 to 7.1  Increase Ozempic to 1 mg weekly  otherwise continue current medication and work on diet  On ACE  Statin intolerant  Recheck 6 months

## 2024-02-26 NOTE — ASSESSMENT & PLAN NOTE
Medical Wellness exam done.   COVID booster 9/23  Prevnar 13 11/16  Pneumovax 10/17  Mammogram 6/22  DEXA within normal limits 6/21  Cologuard 5/23  Nonsmoker

## 2024-02-26 NOTE — PROGRESS NOTES
Subjective :  Chief Complaint: Irene Jett is an 73 y.o. female here for an annual Medicare Wellness visit, annual physical, and follow up labs and chronic conditions.      Patient otherwise feels well. No other complaints or concerns.    I have reviewed and reconciled the medication list with the patient today.    Current Outpatient Medications:     cyanocobalamin, vitamin B-12, 200 mcg/spray spray,suspension, Place 2 sprays under the tongue once daily., Disp: , Rfl:     famotidine (Pepcid) 20 mg tablet, Take 1 tablet (20 mg) by mouth 2 times a day as needed for indigestion or heartburn., Disp: , Rfl:     hydroCHLOROthiazide (Microzide) 12.5 mg capsule, Take 1 capsule (12.5 mg) by mouth once daily., Disp: 90 capsule, Rfl: 3    lisinopril 40 mg tablet, Take 1 tablet (40 mg) by mouth once daily., Disp: 90 tablet, Rfl: 3    metFORMIN (Glucophage) 500 mg tablet, Take 1 tablet (500 mg) by mouth once daily with a meal., Disp: 90 tablet, Rfl: 3    semaglutide 0.25 mg or 0.5 mg (2 mg/3 mL) pen injector, Inject 0.5 mg under the skin every 7 days., Disp: 3 mL, Rfl: 5    The patient's relevant past medical, surgical, family and social history was reviewed in [facility].  All pertinent lab work and results for this visit were reviewed with patient.    Lab on 02/23/2024   Component Date Value Ref Range Status    Thyroid Stimulating Hormone 02/23/2024 4.08 (H)  0.44 - 3.98 mIU/L Final    WBC 02/23/2024 7.7  4.4 - 11.3 x10*3/uL Final    nRBC 02/23/2024 0.0  0.0 - 0.0 /100 WBCs Final    RBC 02/23/2024 4.60  4.00 - 5.20 x10*6/uL Final    Hemoglobin 02/23/2024 14.0  12.0 - 16.0 g/dL Final    Hematocrit 02/23/2024 42.6  36.0 - 46.0 % Final    MCV 02/23/2024 93  80 - 100 fL Final    MCH 02/23/2024 30.4  26.0 - 34.0 pg Final    MCHC 02/23/2024 32.9  32.0 - 36.0 g/dL Final    RDW 02/23/2024 12.5  11.5 - 14.5 % Final    Platelets 02/23/2024 259  150 - 450 x10*3/uL Final    Cholesterol 02/23/2024 166  0 - 199 mg/dL Final          Age       Desirable   Borderline High   High     0-19 Y     0 - 169       170 - 199     >/= 200    20-24 Y     0 - 189       190 - 224     >/= 225         >24 Y     0 - 199       200 - 239     >/= 240   **All ranges are based on fasting samples. Specific   therapeutic targets will vary based on patient-specific   cardiac risk.    Pediatric guidelines reference:Pediatrics 2011, 128(S5).Adult guidelines reference: NCEP ATPIII Guidelines,BLESSING 2001, 258:7536-97    Venipuncture immediately after or during the administration of Metamizole may lead to falsely low results. Testing should be performed immediately prior to Metamizole dosing.    HDL-Cholesterol 02/23/2024 60.6  mg/dL Final      Age       Very Low   Low     Normal    High    0-19 Y    < 35      < 40     40-45     ----  20-24 Y    ----     < 40      >45      ----        >24 Y      ----     < 40     40-60      >60      Cholesterol/HDL Ratio 02/23/2024 2.7   Final      Ref Values  Desirable  < 3.4  High Risk  > 5.0    LDL Calculated 02/23/2024 69  <=99 mg/dL Final                                Near   Borderline      AGE      Desirable  Optimal    High     High     Very High     0-19 Y     0 - 109     ---    110-129   >/= 130     ----    20-24 Y     0 - 119     ---    120-159   >/= 160     ----      >24 Y     0 -  99   100-129  130-159   160-189     >/=190      VLDL 02/23/2024 36  0 - 40 mg/dL Final    Triglycerides 02/23/2024 182 (H)  0 - 149 mg/dL Final       Age         Desirable   Borderline High   High     Very High   0 D-90 D    19 - 174         ----         ----        ----  91 D- 9 Y     0 -  74        75 -  99     >/= 100      ----    10-19 Y     0 -  89        90 - 129     >/= 130      ----    20-24 Y     0 - 114       115 - 149     >/= 150      ----         >24 Y     0 - 149       150 - 199    200- 499    >/= 500    Venipuncture immediately after or during the administration of Metamizole may lead to falsely low results. Testing should be performed immediately  prior to Metamizole dosing.    Non HDL Cholesterol 02/23/2024 105  0 - 149 mg/dL Final          Age       Desirable   Borderline High   High     Very High     0-19 Y     0 - 119       120 - 144     >/= 145    >/= 160    20-24 Y     0 - 149       150 - 189     >/= 190      ----         >24 Y    30 mg/dL above LDL Cholesterol goal      LDL, Direct 02/23/2024 87  0 - 129 mg/dL Final    Glucose 02/23/2024 160 (H)  74 - 99 mg/dL Final    Sodium 02/23/2024 138  136 - 145 mmol/L Final    Potassium 02/23/2024 3.7  3.5 - 5.3 mmol/L Final    Chloride 02/23/2024 102  98 - 107 mmol/L Final    Bicarbonate 02/23/2024 25  21 - 32 mmol/L Final    Anion Gap 02/23/2024 15  10 - 20 mmol/L Final    Urea Nitrogen 02/23/2024 16  6 - 23 mg/dL Final    Creatinine 02/23/2024 0.94  0.50 - 1.05 mg/dL Final    eGFR 02/23/2024 63  >60 mL/min/1.73m*2 Final    Calculations of estimated GFR are performed using the 2021 CKD-EPI Study Refit equation without the race variable for the IDMS-Traceable creatinine methods.  https://jasn.asnjournals.org/content/early/2021/09/22/ASN.4122937189    Calcium 02/23/2024 9.7  8.6 - 10.3 mg/dL Final    Albumin 02/23/2024 4.2  3.4 - 5.0 g/dL Final    Alkaline Phosphatase 02/23/2024 54  33 - 136 U/L Final    Total Protein 02/23/2024 7.1  6.4 - 8.2 g/dL Final    AST 02/23/2024 15  9 - 39 U/L Final    Bilirubin, Total 02/23/2024 0.6  0.0 - 1.2 mg/dL Final    ALT 02/23/2024 16  7 - 45 U/L Final    Patients treated with Sulfasalazine may generate falsely decreased results for ALT.    Hemoglobin A1C 02/23/2024 7.1  % Final    Estimated Average Glucose 02/23/2024 157  Not Established mg/dL Final    Vitamin B12 02/23/2024 1,491 (H)  211 - 911 pg/mL Final    Lyme Disease (Borrelia burgdorferi* 02/23/2024 Not Detected   Final    NOT DETECTED - A negative result does not rule out the   presence of PCR inhibitors in the patient specimen or   assay specific nucleic acid in concentrations below the   level of detection by the  assay.     Blood and CSF specimens have poor clinical sensitivity for   detection of Borrelia burgdorferi by PCR.  INTERPRETIVE INFORMATION: Borrelia Species DNA Detection by PCR    This test was developed and its performance characteristics   determined by [facility]. It has not been cleared or   approved by the US Food and Drug Administration. This test was   performed in a CLIA certified laboratory and is intended for   clinical purposes.  Performed By: [facility]  [address] [place] [address]  : Gabriele Mcclure MD, PhD  CLIA Number: [id number]    Lyme Source 02/23/2024 Blood   Final    Thyroxine, Free 02/23/2024 0.77  0.61 - 1.12 ng/dL Final         Review of Systems   A complete review of systems was performed and all systems were normal except what is noted in the HPI.      List of current healthcare providers:  Patient Care Team:  Reanna Payne MD as PCP - General  Reanna Payne MD as PCP - [employer] Advantage PCP  Jenny Rivera DO as Consulting Physician (Orthopaedic Surgery)  Abilio Clark DO as Consulting Physician (Cardiology)                      Advance Care Planning:    Living Will: {YES (DEF)/NO:25240}  POA: {YES (DEF)/NO:67800}    Objective :  There were no vitals taken for this visit.   No results found.  Physical Exam    Assessment/Plan :  Problem List Items Addressed This Visit       Chronic kidney disease, stage 3a (CMS/HCC)     Stable  On maximal ACE  No microalbuminuria 8/23  Recheck 6 months          Relevant Orders    Comprehensive Metabolic Panel    Albumin , Urine Random    Essential hypertension     Well controlled. Continue current medicine and recheck in 6 months.          Relevant Orders    Comprehensive Metabolic Panel    Essential hypertriglyceridemia     Improved from 203 to 182  continue work on diet  Recheck 6 months          Relevant Orders    Cholesterol, LDL Direct    Lipid Panel    Type 2 diabetes mellitus  with hyperglycemia, without long-term current use of insulin (CMS/[facility])     A1c up a bit from 6.7 to 7.1  Increase Ozempic to 1 mg weekly  otherwise continue current medication and work on diet  On ACE  Statin intolerant  Recheck 6 months          Relevant Orders    Comprehensive Metabolic Panel    Albumin , Urine Random    Hemoglobin A1C    Anti-Thyroglobulin Antibody    Thyroid Peroxidase (TPO) Antibody    TSH with reflex to Free T4 if abnormal    Medicare annual wellness visit, subsequent - Primary     Medical Wellness exam done.   COVID booster 9/23  Prevnar 13 11/16  Pneumovax 10/17  Mammogram 6/22  DEXA within normal limits 6/21  Cologuard 5/23  Nonsmoker           Elevated TSH     T4 normal   Recheck thyroid indices 6 months         Relevant Orders    Anti-Thyroglobulin Antibody    Thyroid Peroxidase (TPO) Antibody    TSH with reflex to Free T4 if abnormal     Other Visit Diagnoses       Annual physical exam        Yearly physical done    Breast screening        Relevant Orders    BI mammo bilateral screening tomosynthesis    Encounter for screening mammogram for malignant neoplasm of breast        Relevant Orders    BI mammo bilateral screening tomosynthesis               The following health maintenance schedule was reviewed with the patient and provided in printed form in the after visit summary:  Health Maintenance   Topic Date Due    Diabetes: Foot Exam  Never done    Diabetes: Retinopathy Screening  Never done    DTaP/Tdap/Td Vaccines (1 - Tdap) Never done    Zoster Vaccines (1 of 2) Never done    Influenza Vaccine (1) 09/01/2023    Medicare Annual Wellness Visit (AWV)  04/29/2024    Diabetes: Hemoglobin A1C  05/23/2024    Diabetes: Urine Protein Screening  08/21/2024    Lipid Panel  02/23/2025    Colorectal Cancer Screening  05/04/2026    Pneumococcal Vaccine: 65+ Years  Completed    Hepatitis C Screening  Completed    Bone Density Scan  Completed    COVID-19 Vaccine  Completed    HIB Vaccines  Aged Out     Hepatitis B Vaccines  Aged Out    IPV Vaccines  Aged Out    Hepatitis A Vaccines  Aged Out    Meningococcal Vaccine  Aged Out    Rotavirus Vaccines  Aged Out    HPV Vaccines  Aged Out           Patient understands and agrees with treatment plan.          Reanna Payne MD

## 2024-03-01 ENCOUNTER — APPOINTMENT (OUTPATIENT)
Dept: PRIMARY CARE | Facility: CLINIC | Age: 74
End: 2024-03-01
Payer: MEDICARE

## 2024-03-06 ENCOUNTER — OFFICE VISIT (OUTPATIENT)
Dept: PRIMARY CARE | Facility: CLINIC | Age: 74
End: 2024-03-06
Payer: MEDICARE

## 2024-03-06 VITALS
TEMPERATURE: 97.1 F | SYSTOLIC BLOOD PRESSURE: 137 MMHG | WEIGHT: 209.8 LBS | BODY MASS INDEX: 30.03 KG/M2 | DIASTOLIC BLOOD PRESSURE: 85 MMHG | OXYGEN SATURATION: 98 % | HEART RATE: 66 BPM | HEIGHT: 70 IN

## 2024-03-06 DIAGNOSIS — R79.89 ELEVATED TSH: ICD-10-CM

## 2024-03-06 DIAGNOSIS — N18.31 CHRONIC KIDNEY DISEASE, STAGE 3A (MULTI): ICD-10-CM

## 2024-03-06 DIAGNOSIS — Z00.00 ANNUAL PHYSICAL EXAM: ICD-10-CM

## 2024-03-06 DIAGNOSIS — E11.65 TYPE 2 DIABETES MELLITUS WITH HYPERGLYCEMIA, WITHOUT LONG-TERM CURRENT USE OF INSULIN (MULTI): ICD-10-CM

## 2024-03-06 DIAGNOSIS — I10 ESSENTIAL HYPERTENSION: ICD-10-CM

## 2024-03-06 DIAGNOSIS — Z78.0 ASYMPTOMATIC MENOPAUSE: ICD-10-CM

## 2024-03-06 DIAGNOSIS — E78.1 ESSENTIAL HYPERTRIGLYCERIDEMIA: ICD-10-CM

## 2024-03-06 DIAGNOSIS — Z00.00 MEDICARE ANNUAL WELLNESS VISIT, SUBSEQUENT: Primary | ICD-10-CM

## 2024-03-06 DIAGNOSIS — H91.93 BILATERAL HEARING LOSS, UNSPECIFIED HEARING LOSS TYPE: ICD-10-CM

## 2024-03-06 PROBLEM — G89.29 CHRONIC NECK PAIN: Status: RESOLVED | Noted: 2023-04-25 | Resolved: 2024-03-06

## 2024-03-06 PROBLEM — M17.9 OSTEOARTHRITIS OF KNEE: Status: RESOLVED | Noted: 2023-04-25 | Resolved: 2024-03-06

## 2024-03-06 PROBLEM — M54.2 CHRONIC NECK PAIN: Status: RESOLVED | Noted: 2023-04-25 | Resolved: 2024-03-06

## 2024-03-06 PROCEDURE — 1158F ADVNC CARE PLAN TLK DOCD: CPT | Performed by: FAMILY MEDICINE

## 2024-03-06 PROCEDURE — 1170F FXNL STATUS ASSESSED: CPT | Performed by: FAMILY MEDICINE

## 2024-03-06 PROCEDURE — 1123F ACP DISCUSS/DSCN MKR DOCD: CPT | Performed by: FAMILY MEDICINE

## 2024-03-06 PROCEDURE — 4010F ACE/ARB THERAPY RXD/TAKEN: CPT | Performed by: FAMILY MEDICINE

## 2024-03-06 PROCEDURE — 1160F RVW MEDS BY RX/DR IN RCRD: CPT | Performed by: FAMILY MEDICINE

## 2024-03-06 PROCEDURE — 99214 OFFICE O/P EST MOD 30 MIN: CPT | Performed by: FAMILY MEDICINE

## 2024-03-06 PROCEDURE — 3048F LDL-C <100 MG/DL: CPT | Performed by: FAMILY MEDICINE

## 2024-03-06 PROCEDURE — 3051F HG A1C>EQUAL 7.0%<8.0%: CPT | Performed by: FAMILY MEDICINE

## 2024-03-06 PROCEDURE — 1159F MED LIST DOCD IN RCRD: CPT | Performed by: FAMILY MEDICINE

## 2024-03-06 PROCEDURE — 3075F SYST BP GE 130 - 139MM HG: CPT | Performed by: FAMILY MEDICINE

## 2024-03-06 PROCEDURE — G0439 PPPS, SUBSEQ VISIT: HCPCS | Performed by: FAMILY MEDICINE

## 2024-03-06 PROCEDURE — 1157F ADVNC CARE PLAN IN RCRD: CPT | Performed by: FAMILY MEDICINE

## 2024-03-06 PROCEDURE — 99397 PER PM REEVAL EST PAT 65+ YR: CPT | Performed by: FAMILY MEDICINE

## 2024-03-06 PROCEDURE — 1036F TOBACCO NON-USER: CPT | Performed by: FAMILY MEDICINE

## 2024-03-06 PROCEDURE — 3079F DIAST BP 80-89 MM HG: CPT | Performed by: FAMILY MEDICINE

## 2024-03-06 ASSESSMENT — ACTIVITIES OF DAILY LIVING (ADL)
TAKING_MEDICATION: INDEPENDENT
MANAGING_FINANCES: INDEPENDENT
BATHING: INDEPENDENT
DOING_HOUSEWORK: INDEPENDENT
DRESSING: INDEPENDENT
GROCERY_SHOPPING: INDEPENDENT

## 2024-03-06 ASSESSMENT — PATIENT HEALTH QUESTIONNAIRE - PHQ9
1. LITTLE INTEREST OR PLEASURE IN DOING THINGS: NOT AT ALL
2. FEELING DOWN, DEPRESSED OR HOPELESS: NOT AT ALL
SUM OF ALL RESPONSES TO PHQ9 QUESTIONS 1 AND 2: 0

## 2024-03-06 ASSESSMENT — ENCOUNTER SYMPTOMS
LOSS OF SENSATION IN FEET: 0
DEPRESSION: 0
OCCASIONAL FEELINGS OF UNSTEADINESS: 0

## 2024-03-06 NOTE — ASSESSMENT & PLAN NOTE
Stable  On maximal ACE  Start jardiance as directed - Risks, benefits and side effects reviewed with patient.   No microalbuminuria 8/23  Recheck 6 months

## 2024-03-06 NOTE — PATIENT INSTRUCTIONS
I recommend RSV vaccine, Shingrix, and Boostrix at the pharmacy.    I would like you to follow up in 6 months  Please have all labs that were ordered done at least 1 week prior to your visit.    Recommend healthy diet based around fruits, vegetables, and lean proteins such as chicken, turkey, fish, and beans.  Also include moderate portions of healthy carbohydrates such as wheat bread and pasta, sweet potatoes. Limit sweets and alcoholic beverages. Try not drink more than 100 calories in beverages daily.   It is important to get a protein-rich breakfast daily such as oatmeal, eggs or Greek yogurt.  Increase activity as able to a recommended goal of at least 30 minutes of cardiovascular exercise (walking, swimming, biking, jogging etc.) at least 5 days weekly and a goal of 45 minutes or more most days of the week for weight loss. This exercise can be done all at one time or broken up into 2 or more sessions throughout the day.

## 2024-03-06 NOTE — ASSESSMENT & PLAN NOTE
A1c up a bit from 6.7 to 7.1  Side effects on Ozempic - will stop  Start jardiance as directed - Risks, benefits and side effects reviewed with patient.   Clinical pharmacy referral done  otherwise continue current medication and work on diet  On ACE  Statin intolerant  Recheck 6 months

## 2024-03-06 NOTE — PROGRESS NOTES
Subjective :  Chief Complaint: Irene Jett is an 73 y.o. female here for an annual Medicare Wellness visit, annual physical, and follow up labs and chronic conditions.    Having GERD, constipation and abdominal pain on Ozempic. Was off 1 month and felt better.      Patient otherwise feels well. No other complaints or concerns.    I have reviewed and reconciled the medication list with the patient today.    Current Outpatient Medications:     cyanocobalamin, vitamin B-12, 200 mcg/spray spray,suspension, Place 1 spray under the tongue once daily., Disp: , Rfl:     famotidine (Pepcid) 20 mg tablet, Take 1 tablet (20 mg) by mouth 2 times a day as needed for indigestion or heartburn., Disp: , Rfl:     hydroCHLOROthiazide (Microzide) 12.5 mg capsule, Take 1 capsule (12.5 mg) by mouth once daily., Disp: 90 capsule, Rfl: 3    lisinopril 40 mg tablet, Take 1 tablet (40 mg) by mouth once daily., Disp: 90 tablet, Rfl: 3    metFORMIN (Glucophage) 500 mg tablet, Take 1 tablet (500 mg) by mouth once daily with a meal., Disp: 90 tablet, Rfl: 3    empagliflozin (Jardiance) 10 mg, Take 1 tablet (10 mg) by mouth once daily., Disp: 30 tablet, Rfl: 11    The patient's relevant past medical, surgical, family and social history was reviewed in Psychiatric.  All pertinent lab work and results for this visit were reviewed with patient.    Lab on 02/23/2024   Component Date Value Ref Range Status    Thyroid Stimulating Hormone 02/23/2024 4.08 (H)  0.44 - 3.98 mIU/L Final    WBC 02/23/2024 7.7  4.4 - 11.3 x10*3/uL Final    nRBC 02/23/2024 0.0  0.0 - 0.0 /100 WBCs Final    RBC 02/23/2024 4.60  4.00 - 5.20 x10*6/uL Final    Hemoglobin 02/23/2024 14.0  12.0 - 16.0 g/dL Final    Hematocrit 02/23/2024 42.6  36.0 - 46.0 % Final    MCV 02/23/2024 93  80 - 100 fL Final    MCH 02/23/2024 30.4  26.0 - 34.0 pg Final    MCHC 02/23/2024 32.9  32.0 - 36.0 g/dL Final    RDW 02/23/2024 12.5  11.5 - 14.5 % Final    Platelets 02/23/2024 259  150 - 450 x10*3/uL  Final    Cholesterol 02/23/2024 166  0 - 199 mg/dL Final          Age      Desirable   Borderline High   High     0-19 Y     0 - 169       170 - 199     >/= 200    20-24 Y     0 - 189       190 - 224     >/= 225         >24 Y     0 - 199       200 - 239     >/= 240   **All ranges are based on fasting samples. Specific   therapeutic targets will vary based on patient-specific   cardiac risk.    Pediatric guidelines reference:Pediatrics 2011, 128(S5).Adult guidelines reference: NCEP ATPIII Guidelines,BLESSING 2001, 258:2486-97    Venipuncture immediately after or during the administration of Metamizole may lead to falsely low results. Testing should be performed immediately prior to Metamizole dosing.    HDL-Cholesterol 02/23/2024 60.6  mg/dL Final      Age       Very Low   Low     Normal    High    0-19 Y    < 35      < 40     40-45     ----  20-24 Y    ----     < 40      >45      ----        >24 Y      ----     < 40     40-60      >60      Cholesterol/HDL Ratio 02/23/2024 2.7   Final      Ref Values  Desirable  < 3.4  High Risk  > 5.0    LDL Calculated 02/23/2024 69  <=99 mg/dL Final                                Near   Borderline      AGE      Desirable  Optimal    High     High     Very High     0-19 Y     0 - 109     ---    110-129   >/= 130     ----    20-24 Y     0 - 119     ---    120-159   >/= 160     ----      >24 Y     0 -  99   100-129  130-159   160-189     >/=190      VLDL 02/23/2024 36  0 - 40 mg/dL Final    Triglycerides 02/23/2024 182 (H)  0 - 149 mg/dL Final       Age         Desirable   Borderline High   High     Very High   0 D-90 D    19 - 174         ----         ----        ----  91 D- 9 Y     0 -  74        75 -  99     >/= 100      ----    10-19 Y     0 -  89        90 - 129     >/= 130      ----    20-24 Y     0 - 114       115 - 149     >/= 150      ----         >24 Y     0 - 149       150 - 199    200- 499    >/= 500    Venipuncture immediately after or during the administration of Metamizole  may lead to falsely low results. Testing should be performed immediately prior to Metamizole dosing.    Non HDL Cholesterol 02/23/2024 105  0 - 149 mg/dL Final          Age       Desirable   Borderline High   High     Very High     0-19 Y     0 - 119       120 - 144     >/= 145    >/= 160    20-24 Y     0 - 149       150 - 189     >/= 190      ----         >24 Y    30 mg/dL above LDL Cholesterol goal      LDL, Direct 02/23/2024 87  0 - 129 mg/dL Final    Glucose 02/23/2024 160 (H)  74 - 99 mg/dL Final    Sodium 02/23/2024 138  136 - 145 mmol/L Final    Potassium 02/23/2024 3.7  3.5 - 5.3 mmol/L Final    Chloride 02/23/2024 102  98 - 107 mmol/L Final    Bicarbonate 02/23/2024 25  21 - 32 mmol/L Final    Anion Gap 02/23/2024 15  10 - 20 mmol/L Final    Urea Nitrogen 02/23/2024 16  6 - 23 mg/dL Final    Creatinine 02/23/2024 0.94  0.50 - 1.05 mg/dL Final    eGFR 02/23/2024 63  >60 mL/min/1.73m*2 Final    Calculations of estimated GFR are performed using the 2021 CKD-EPI Study Refit equation without the race variable for the IDMS-Traceable creatinine methods.  https://jasn.asnjournals.org/content/early/2021/09/22/ASN.4354776597    Calcium 02/23/2024 9.7  8.6 - 10.3 mg/dL Final    Albumin 02/23/2024 4.2  3.4 - 5.0 g/dL Final    Alkaline Phosphatase 02/23/2024 54  33 - 136 U/L Final    Total Protein 02/23/2024 7.1  6.4 - 8.2 g/dL Final    AST 02/23/2024 15  9 - 39 U/L Final    Bilirubin, Total 02/23/2024 0.6  0.0 - 1.2 mg/dL Final    ALT 02/23/2024 16  7 - 45 U/L Final    Patients treated with Sulfasalazine may generate falsely decreased results for ALT.    Hemoglobin A1C 02/23/2024 7.1  % Final    Estimated Average Glucose 02/23/2024 157  Not Established mg/dL Final    Vitamin B12 02/23/2024 1,491 (H)  211 - 911 pg/mL Final    Lyme Disease (Borrelia burgdorferi* 02/23/2024 Not Detected   Final    NOT DETECTED - A negative result does not rule out the   presence of PCR inhibitors in the patient specimen or   assay  "specific nucleic acid in concentrations below the   level of detection by the assay.     Blood and CSF specimens have poor clinical sensitivity for   detection of Borrelia burgdorferi by PCR.  INTERPRETIVE INFORMATION: Borrelia Species DNA Detection by PCR    This test was developed and its performance characteristics   determined by [facility]. It has not been cleared or   approved by the US Food and Drug Administration. This test was   performed in a CLIA certified laboratory and is intended for   clinical purposes.  Performed By: [facility]  [address] [place] [id number]  : Gabriele Mcclure MD, PhD  CLIA Number: [id number]    Lyme Source 02/23/2024 Blood   Final    Thyroxine, Free 02/23/2024 0.77  0.61 - 1.12 ng/dL Final         Review of Systems   A complete review of systems was performed and all systems were normal except what is noted in the HPI.      List of current healthcare providers:  Patient Care Team:  Reanna Payne MD as PCP - General  Reanna Payne MD as PCP - [employer] Advantage PCP  Jenny Rivera DO as Consulting Physician (Orthopaedic Surgery)  Abilio Clark DO as Consulting Physician (Cardiology)        Over the past 2 weeks, how often have you been bothered by any of the following problems?  Little interest or pleasure in doing things: Not at all  Feeling down, depressed, or hopeless: Not at all  Patient Health Questionnaire-2 Score: 0             Advance Care Planning:    Living Will: Yes  POA: Yes    Objective :  /85 (BP Location: Left arm, Patient Position: Sitting)   Pulse 66   Temp 36.2 °C (97.1 °F)   Ht 1.778 m (5' 10\")   Wt 95.2 kg (209 lb 12.8 oz)   SpO2 98%   BMI 30.10 kg/m²    No results found.  Physical Exam  Constitutional:       Appearance: Normal appearance. She is obese.   HENT:      Head: Normocephalic and atraumatic.   Neck:      Vascular: No carotid bruit.   Cardiovascular:      Rate and Rhythm: " Normal rate and regular rhythm.      Heart sounds: Normal heart sounds.   Pulmonary:      Effort: Pulmonary effort is normal.      Breath sounds: Normal breath sounds. No wheezing, rhonchi or rales.   Abdominal:      General: Abdomen is flat. Bowel sounds are normal.      Palpations: Abdomen is soft.      Tenderness: There is no abdominal tenderness. There is no guarding.   Musculoskeletal:         General: Normal range of motion.      Right lower leg: No edema.      Left lower leg: No edema.   Skin:     General: Skin is dry.   Neurological:      General: No focal deficit present.      Mental Status: She is alert and oriented to person, place, and time.   Psychiatric:         Mood and Affect: Mood normal.         Behavior: Behavior normal.         Thought Content: Thought content normal.         Assessment/Plan :  Problem List Items Addressed This Visit       Chronic kidney disease, stage 3a (CMS/[facility])     Stable  On maximal ACE  Start jardiance as directed - Risks, benefits and side effects reviewed with patient.   No microalbuminuria 8/23  Recheck 6 months          Relevant Medications    empagliflozin (Jardiance) 10 mg    Other Relevant Orders    Referral to Clinical Pharmacy    Basic metabolic panel    Hemoglobin A1c    Essential hypertension     Well controlled. Continue current medicine and recheck in 6 months.          Relevant Orders    Referral to Clinical Pharmacy    Essential hypertriglyceridemia     Improved from 203 to 182  continue work on diet  Recheck 6 months          Type 2 diabetes mellitus with hyperglycemia, without long-term current use of insulin (CMS/[facility])     A1c up a bit from 6.7 to 7.1  Side effects on Ozempic - will stop  Start jardiance as directed - Risks, benefits and side effects reviewed with patient.   Clinical pharmacy referral done  otherwise continue current medication and work on diet  On ACE  Statin intolerant  Recheck 6 months          Relevant Medications    empagliflozin  (Jardiance) 10 mg    Other Relevant Orders    Referral to Clinical Pharmacy    Basic metabolic panel    Hemoglobin A1c    Medicare annual wellness visit, subsequent - Primary     Medical Wellness exam done.   COVID booster 9/23  Prevnar 13 11/16  Pneumovax 10/17  Mammogram 6/22  DEXA within normal limits 6/21  Cologuard 5/23  Nonsmoker           Elevated TSH     T4 normal   Recheck thyroid indices 6 months          Other Visit Diagnoses       Annual physical exam        Yearly physical done    Asymptomatic menopause        Relevant Orders    XR DEXA bone density    Bilateral hearing loss, unspecified hearing loss type        Relevant Orders    Referral to Audiology               The following health maintenance schedule was reviewed with the patient and provided in printed form in the after visit summary:  Health Maintenance   Topic Date Due    Diabetes: Foot Exam  Never done    Diabetes: Retinopathy Screening  Never done    DTaP/Tdap/Td Vaccines (1 - Tdap) Never done    Zoster Vaccines (1 of 2) Never done    Influenza Vaccine (1) 09/01/2023    Medicare Annual Wellness Visit (AWV)  04/29/2024    Diabetes: Hemoglobin A1C  05/23/2024    Diabetes: Urine Protein Screening  08/21/2024    Lipid Panel  02/23/2025    Colorectal Cancer Screening  05/04/2026    Pneumococcal Vaccine: 65+ Years  Completed    Hepatitis C Screening  Completed    Bone Density Scan  Completed    COVID-19 Vaccine  Completed    HIB Vaccines  Aged Out    Hepatitis B Vaccines  Aged Out    IPV Vaccines  Aged Out    Hepatitis A Vaccines  Aged Out    Meningococcal Vaccine  Aged Out    Rotavirus Vaccines  Aged Out    HPV Vaccines  Aged Out           Patient understands and agrees with treatment plan.          Reanna Payne MD

## 2024-03-11 ENCOUNTER — CLINICAL SUPPORT (OUTPATIENT)
Dept: AUDIOLOGY | Facility: HOSPITAL | Age: 74
End: 2024-03-11
Payer: MEDICARE

## 2024-03-11 DIAGNOSIS — H90.3 SENSORINEURAL HEARING LOSS (SNHL) OF BOTH EARS: Primary | ICD-10-CM

## 2024-03-11 DIAGNOSIS — H91.93 BILATERAL HEARING LOSS, UNSPECIFIED HEARING LOSS TYPE: ICD-10-CM

## 2024-03-11 PROCEDURE — 92557 COMPREHENSIVE HEARING TEST: CPT | Performed by: AUDIOLOGIST

## 2024-03-11 PROCEDURE — 92550 TYMPANOMETRY & REFLEX THRESH: CPT | Performed by: AUDIOLOGIST

## 2024-03-11 ASSESSMENT — PAIN SCALES - GENERAL: PAINLEVEL_OUTOF10: 0 - NO PAIN

## 2024-03-11 ASSESSMENT — PAIN - FUNCTIONAL ASSESSMENT: PAIN_FUNCTIONAL_ASSESSMENT: 0-10

## 2024-03-11 NOTE — LETTER
2024     Reanna Payne MD  [address]  [facility], [address] [place] [id number]    Patient: Irene Jett   YOB: 1950   Date of Visit: 3/11/2024       Dear Dr. Reanna Payne MD:    Thank you for referring Irene Jett to me for evaluation. Below are my notes for this consultation.  If you have questions, please do not hesitate to call me. I look forward to following your patient along with you.       Sincerely,     VINOD Johns, CCC-A      CC: No Recipients  ______________________________________________________________________________________    AUDIOLOGY ADULT AUDIOMETRIC EVALUATION      Name:  Irene Jett  :  1950  Age:  73 y.o.  Date of Evaluation:  3/11/2024     History:  Reason for visit:  Ms. Jett is seen today at the request of Reanna Payne MD for an evaluation of hearing.  Patient complains of Hearing Loss (Gradually progressive hearing loss bilaterally, worsening in the last year.  Some difficulty hearing conversation at home.  Struggles to hear dialogue on television when there is background noise)        Previous hearing test:   many years ago at work, no hearing loss at the time    Otalgia:  no    Aural Fullness:  no    Otitis Media: no    PE Tubes:  no  Other otologic surgical history:  no    Tinnitus:   yes  Laterality: bilateral  Character:  sounds like electrical hum  Frequency of occurrence: occasionally  Onset: many years  Bothersome: no  Disturbance of daily activities: no   Disturbance of sleep: no  Pulsatile: no    Dizziness:   occasionally, with some medications, mostly feels off balance, has a history of vertigo (BPPV diagnosed and treated successfully    Noise Exposure: no    Family history of hearing loss:   parents, with hearing loss in older adulthood    Other significant history:  right ear, pinna, hurts after lying on it all night.  Treated for diabetes.    Hearing aid history:        none    EVALUATION          RESULTS:    Otoscopic Evaluation:        Right ear: mostly clear ear canal, tympanic membrane visualized        Left ear: mostly clear ear canal, tympanic membrane visualized     Immittance Measures: 226Hz       Right Probe Ear: Tympanogram shows normal middle ear pressure, static compliance, and ear canal volume.  Acoustic reflexes were consistent with pure tone results.       Left Probe Ear: Tympanogram shows normal middle ear pressure, static compliance, and ear canal volume.  Acoustic reflexes were consistent with pure tone results.    Test technique:  Pure Tone Audiometry via insert earphones    Reliability:   good    Pure Tone Audiometry:         Right ear: normal hearing sensitivity from 125-1500Hz sloping to moderate high frequency sensorineural hearing loss        Left ear: normal hearing sensitivity from 125-1000Hz sloping to moderate high frequency sensorineural hearing loss     Speech Audiometry:        Right Ear:  Speech Reception Threshold (SRT) was obtained at 25 dBHL                 Word Recognition scores were excellent at 40dBSL re: SRT       Left Ear:  Speech Reception Threshold (SRT) was obtained at 20 dBHL                 Word Recognition scores were excellent at 40dBSL re: SRT    Distortion Product Otoacoustic Emissions:        Right Ear:  present 1500-2000Hz, absent 3000-8000Hz        Left Ear:  present 1500-2000Hz, absent 3000-8000Hz  Present OAEs suggest normal cochlear outer hair cell function.  Absent OAEs are consistent with some degree of hearing loss.    IMPRESSIONS:  Today's test results are consistent with mild to moderate high frequency sensorineural hearing loss with excellent word discrimination and normal tympanic membrane mobility bilaterally.  If there are no medical contraindications, this patient could be considered a good candidate for binaural amplification.     RECOMMENDATIONS:  -Annual audiologic monitoring, or as changes are noted.  -Hearing  Aid consultation.  Patient to check with insurance regarding benefits and covered providers.   -Consider medical consultation regarding occasional right ear pain.    PATIENT EDUCATION:   Discussed results and recommendations with Ms. Jett.  Questions were addressed and the patient was encouraged to contact our department should concerns arise.    Time:  0833 to 09:20    VINOD Johns, CCC-A  Senior Audiologist

## 2024-03-11 NOTE — PROGRESS NOTES
AUDIOLOGY ADULT AUDIOMETRIC EVALUATION      Name:  Irene Jett  :  1950  Age:  73 y.o.  Date of Evaluation:  3/11/2024     History:  Reason for visit:  Ms. Jett is seen today at the request of Reanna Payne MD for an evaluation of hearing.  Patient complains of Hearing Loss (Gradually progressive hearing loss bilaterally, worsening in the last year.  Some difficulty hearing conversation at home.  Struggles to hear dialogue on television when there is background noise)        Previous hearing test:   many years ago at work, no hearing loss at the time    Otalgia:  no    Aural Fullness:  no    Otitis Media: no    PE Tubes:  no  Other otologic surgical history:  no    Tinnitus:   yes  Laterality: bilateral  Character:  sounds like electrical hum  Frequency of occurrence: occasionally  Onset: many years  Bothersome: no  Disturbance of daily activities: no   Disturbance of sleep: no  Pulsatile: no    Dizziness:   occasionally, with some medications, mostly feels off balance, has a history of vertigo (BPPV diagnosed and treated successfully    Noise Exposure: no    Family history of hearing loss:   parents, with hearing loss in older adulthood    Other significant history:  right ear, pinna, hurts after lying on it all night.  Treated for diabetes.    Hearing aid history:       none    EVALUATION          RESULTS:    Otoscopic Evaluation:        Right ear: mostly clear ear canal, tympanic membrane visualized        Left ear: mostly clear ear canal, tympanic membrane visualized     Immittance Measures: 226Hz       Right Probe Ear: Tympanogram shows normal middle ear pressure, static compliance, and ear canal volume.  Acoustic reflexes were consistent with pure tone results.       Left Probe Ear: Tympanogram shows normal middle ear pressure, static compliance, and ear canal volume.  Acoustic reflexes were consistent with pure tone results.    Test technique:  Pure Tone Audiometry via insert  earphones    Reliability:   good    Pure Tone Audiometry:         Right ear: normal hearing sensitivity from 125-1500Hz sloping to moderate high frequency sensorineural hearing loss        Left ear: normal hearing sensitivity from 125-1000Hz sloping to moderate high frequency sensorineural hearing loss     Speech Audiometry:        Right Ear:  Speech Reception Threshold (SRT) was obtained at 25 dBHL                 Word Recognition scores were excellent at 40dBSL re: SRT       Left Ear:  Speech Reception Threshold (SRT) was obtained at 20 dBHL                 Word Recognition scores were excellent at 40dBSL re: SRT    Distortion Product Otoacoustic Emissions:        Right Ear:  present 1500-2000Hz, absent 3000-8000Hz        Left Ear:  present 1500-2000Hz, absent 3000-8000Hz  Present OAEs suggest normal cochlear outer hair cell function.  Absent OAEs are consistent with some degree of hearing loss.    IMPRESSIONS:  Today's test results are consistent with mild to moderate high frequency sensorineural hearing loss with excellent word discrimination and normal tympanic membrane mobility bilaterally.  If there are no medical contraindications, this patient could be considered a good candidate for binaural amplification.     RECOMMENDATIONS:  -Annual audiologic monitoring, or as changes are noted.  -Hearing Aid consultation.  Patient to check with insurance regarding benefits and covered providers.   -Consider medical consultation regarding occasional right ear pain.    PATIENT EDUCATION:   Discussed results and recommendations with Ms. Jett.  Questions were addressed and the patient was encouraged to contact our department should concerns arise.    Time:  0833 to 09:20    VINOD Johns, CCC-A  Senior Audiologist

## 2024-03-18 ENCOUNTER — TELEMEDICINE (OUTPATIENT)
Dept: PHARMACY | Facility: HOSPITAL | Age: 74
End: 2024-03-18
Payer: MEDICARE

## 2024-03-18 DIAGNOSIS — E11.65 TYPE 2 DIABETES MELLITUS WITH HYPERGLYCEMIA, WITHOUT LONG-TERM CURRENT USE OF INSULIN (MULTI): ICD-10-CM

## 2024-03-18 DIAGNOSIS — I10 ESSENTIAL HYPERTENSION: ICD-10-CM

## 2024-03-18 DIAGNOSIS — N18.31 CHRONIC KIDNEY DISEASE, STAGE 3A (MULTI): ICD-10-CM

## 2024-03-18 NOTE — ASSESSMENT & PLAN NOTE
Patient's most recent eGFR was 63. Based on this, all medications are properly dose adjusted based on renal function.   Patient recently started on SGLT-2 (Jardiance 10 mg) per recommendations for CKD management. She is tolerating well at this time.     Plan:  CONTINUE Jardiance 10 mg daily

## 2024-03-18 NOTE — PROGRESS NOTES
Subjective   Patient ID: Irene Jett is a 73 y.o. female who presents for Diabetes, Hypertension, and Chronic Kidney Disease.    Referring Provider: Reanna Payne MD     Diabetes  She presents for her initial diabetic visit. She has type 2 diabetes mellitus. Her disease course has been improving. There are no hypoglycemic associated symptoms. Current diabetic treatment includes oral agent (dual therapy). An ACE inhibitor/angiotensin II receptor blocker is being taken.       Current  Pharmacotherapy:   Jardiance 10 mg   Metformin 500 mg daily      Historical Pharmacotherapy:  Ozempic (stopped due to constipation, GERD)  Invokana (stopped due to yeast infection, did have good glucose benefits and weight loss benefits for her though)  Sitagliptin (stopped due to swelling)  SECONDARY PREVENTION  - Statin? No  - ACE-I/ARB? Lisinopril 40 mg   - Aspirin? No    -The 10-year ASCVD risk score (Do CASTRO, et al., 2019) is: 40.4%    Values used to calculate the score:      Age: 73 years      Sex: Female      Is Non- : No      Diabetic: Yes      Tobacco smoker: No      Systolic Blood Pressure: 137 mmHg      Is BP treated: Yes      HDL Cholesterol: 60.6 mg/dL      Total Cholesterol: 166 mg/dL      Current monitoring regimen:   Patient is using: finger sticks, once daily     SMBG Fasting Readings: 120-140 in the mornings       Hypertension  Current Pharmacotherapy:   Hydrochlorothiazide 12.5 mg daily   Lisinopril 40 mg daily     Notable Historical Therapy: n/a      Current Monitoring:   Last Office BP Readin/85 (3/6/2024)    BP Cuff at Home: yes  Testing BP at Home: yes    Home BP Readings: none given at today's visit    Home Blood Pressure Monitoring Education Provided: yes (3/18/24)     CKD  eGFR- 63 (2024)  SGLT-2: Jardiance 10 mg daily     Review of Systems    Medication System Management:  Affordability/Accessibility:   -concerns for when she hits the donut hole. We discuss options  for PAP at that time if needed.   Adherence/Organization: n/a  Adverse Effects: n/a  -concerned for yeast infection on the Jardiance due to hx with invokana. Discuss signs and symptoms and what to do if these occur.     Objective     There were no vitals taken for this visit.     Labs  Lab Results   Component Value Date    BILITOT 0.6 02/23/2024    CALCIUM 9.7 02/23/2024    CO2 25 02/23/2024     02/23/2024    CREATININE 0.94 02/23/2024    GLUCOSE 160 (H) 02/23/2024    ALKPHOS 54 02/23/2024    K 3.7 02/23/2024    PROT 7.1 02/23/2024     02/23/2024    AST 15 02/23/2024    ALT 16 02/23/2024    BUN 16 02/23/2024    ANIONGAP 15 02/23/2024    ALBUMIN 4.2 02/23/2024    GFRF 63 08/21/2023     Lab Results   Component Value Date    TRIG 182 (H) 02/23/2024    CHOL 166 02/23/2024    LDLCALC 69 02/23/2024    HDL 60.6 02/23/2024     Lab Results   Component Value Date    HGBA1C 7.1 02/23/2024       Current Outpatient Medications on File Prior to Visit   Medication Sig Dispense Refill    cyanocobalamin, vitamin B-12, 200 mcg/spray spray,suspension Place 1 spray under the tongue once daily.      empagliflozin (Jardiance) 10 mg Take 1 tablet (10 mg) by mouth once daily. 30 tablet 11    famotidine (Pepcid) 20 mg tablet Take 1 tablet (20 mg) by mouth 2 times a day as needed for indigestion or heartburn.      hydroCHLOROthiazide (Microzide) 12.5 mg capsule Take 1 capsule (12.5 mg) by mouth once daily. 90 capsule 3    lisinopril 40 mg tablet Take 1 tablet (40 mg) by mouth once daily. 90 tablet 3    metFORMIN (Glucophage) 500 mg tablet Take 1 tablet (500 mg) by mouth once daily with a meal. 90 tablet 3     No current facility-administered medications on file prior to visit.        Assessment/Plan   Problem List Items Addressed This Visit       Chronic kidney disease, stage 3a (CMS/HCC)     Patient's most recent eGFR was 63. Based on this, all medications are properly dose adjusted based on renal function.   Patient recently  started on SGLT-2 (Jardiance 10 mg) per recommendations for CKD management. She is tolerating well at this time.     Plan:  CONTINUE Jardiance 10 mg daily          Essential hypertension     Patient reports that blood pressures are well controlled at home. I ask that patient keep track of her readings for review at our next call. Patient is agreeable.    Plan:  CONTINUE hydrochlorothiazide and lisinopril as currently prescribed.          Type 2 diabetes mellitus with hyperglycemia, without long-term current use of insulin (CMS/[facility])     Patient's diabetes is mildly uncontrolled with an A1c of 7.1%(goal <7%). Patient recently started on Jardiance 10 mg at most recent PCP visit. She is doing well so far on therapy with no complaints. We discuss risks for UTI/yeast infections and ask that if she has any symptoms to call the office or myself. She agrees.    We discuss titration of Jardiance if sugars do not reach the goal she is hoping. States they are typically 120-140 most mornings (goal ).     Plan:  CONTINUE Jardiance 10 mg and metformin 500 mg daily as prescribed.              Pharmacy Follow Up: 4/15 @ 10  AM   PCP Follow Up: 6/10/2024    Rosanne Rachel, Aileen    Continue all meds under the continuation of care with the referring provider and clinical pharmacy team.

## 2024-03-18 NOTE — ASSESSMENT & PLAN NOTE
Patient reports that blood pressures are well controlled at home. I ask that patient keep track of her readings for review at our next call. Patient is agreeable.    Plan:  CONTINUE hydrochlorothiazide and lisinopril as currently prescribed.

## 2024-03-18 NOTE — ASSESSMENT & PLAN NOTE
Patient's diabetes is mildly uncontrolled with an A1c of 7.1%(goal <7%). Patient recently started on Jardiance 10 mg at most recent PCP visit. She is doing well so far on therapy with no complaints. We discuss risks for UTI/yeast infections and ask that if she has any symptoms to call the office or myself. She agrees.    We discuss titration of Jardiance if sugars do not reach the goal she is hoping. States they are typically 120-140 most mornings (goal ).     Plan:  CONTINUE Jardiance 10 mg and metformin 500 mg daily as prescribed.

## 2024-04-03 ENCOUNTER — HOSPITAL ENCOUNTER (OUTPATIENT)
Dept: RADIOLOGY | Facility: CLINIC | Age: 74
Discharge: HOME | End: 2024-04-03
Payer: MEDICARE

## 2024-04-03 DIAGNOSIS — Z78.0 ASYMPTOMATIC MENOPAUSE: ICD-10-CM

## 2024-04-03 PROCEDURE — 77080 DXA BONE DENSITY AXIAL: CPT

## 2024-04-03 PROCEDURE — 77080 DXA BONE DENSITY AXIAL: CPT | Performed by: RADIOLOGY

## 2024-04-15 ENCOUNTER — TELEMEDICINE (OUTPATIENT)
Dept: PHARMACY | Facility: HOSPITAL | Age: 74
End: 2024-04-15
Payer: MEDICARE

## 2024-04-15 DIAGNOSIS — I10 ESSENTIAL HYPERTENSION: ICD-10-CM

## 2024-04-15 DIAGNOSIS — E11.65 TYPE 2 DIABETES MELLITUS WITH HYPERGLYCEMIA, WITHOUT LONG-TERM CURRENT USE OF INSULIN (MULTI): Primary | ICD-10-CM

## 2024-04-15 RX ORDER — HYDROCHLOROTHIAZIDE 12.5 MG/1
12.5 CAPSULE ORAL DAILY
Qty: 90 CAPSULE | Refills: 3 | Status: SHIPPED | OUTPATIENT
Start: 2024-04-15 | End: 2024-04-29 | Stop reason: ALTCHOICE

## 2024-04-15 NOTE — ASSESSMENT & PLAN NOTE
Patient is not testing blood pressures regularly at home, as they have continued to be well controlled. She states that since starting Farxiga, she has increased urination both from that and hydrochlorothiazide. She is interested in coming off the hydrochlorothiazide. I discuss that I would like her to test her blood pressures regularly between now and our next visit to ensure pressures are well controlled prior to stopping therapy. Patient is agreeable.     Plan:  CONTINUE hydrochlorothiazide and lisinopril as currently prescribed.

## 2024-04-15 NOTE — ASSESSMENT & PLAN NOTE
Patient's diabetes is mildly uncontrolled with an A1c of 7.1%(goal <7%). Patient continues to do well on Jardiance 10 mg but states that sugars are still slightly elevated. She has lost a total of 5 lbs since starting therapy.     We discuss titration of Jardiance if sugars do not reach the goal she is hoping. States they are typically 120-140 most mornings (goal ).     Plan:  INCREASE to Jardiance 25 mg daily  Rx sent to [facility] to be mailed to patient.   CONTINUE metformin 500 mg daily as prescribed.

## 2024-04-15 NOTE — PROGRESS NOTES
Subjective   Patient ID: Irene Jett is a 74 y.o. female who presents for Diabetes.    Referring Provider: Reanna Payne MD     Diabetes  She presents for her follow-up diabetic visit. She has type 2 diabetes mellitus. Her disease course has been improving. There are no hypoglycemic associated symptoms. Current diabetic treatment includes oral agent (dual therapy). An ACE inhibitor/angiotensin II receptor blocker is being taken.       Current  Pharmacotherapy:   Jardiance 10 mg   Metformin 500 mg daily      Historical Pharmacotherapy:  Ozempic (stopped due to constipation, GERD)  Invokana (stopped due to yeast infection, did have good glucose benefits and weight loss benefits for her though)  Sitagliptin (stopped due to swelling)    SECONDARY PREVENTION  - Statin? No  - ACE-I/ARB? Lisinopril 40 mg   - Aspirin? No    -The 10-year ASCVD risk score (Do CASTRO, et al., 2019) is: 44.3%    Values used to calculate the score:      Age: 74 years      Sex: Female      Is Non- : No      Diabetic: Yes      Tobacco smoker: No      Systolic Blood Pressure: 137 mmHg      Is BP treated: Yes      HDL Cholesterol: 60.6 mg/dL      Total Cholesterol: 166 mg/dL      Current monitoring regimen:   Patient is using: finger sticks, once daily     SMBG Fasting Readings: 120-140 in the mornings       Hypertension  Current Pharmacotherapy:   Hydrochlorothiazide 12.5 mg daily   Lisinopril 40 mg daily     Notable Historical Therapy: n/a      Current Monitoring:   Last Office BP Readin/85 (3/6/2024)    BP Cuff at Home: yes  Testing BP at Home: yes    Home BP Readings: none given at today's visit    Home Blood Pressure Monitoring Education Provided: yes (3/18/24)     CKD  eGFR- 63 (2024)  SGLT-2: Jardiance 10 mg daily     Review of Systems    Medication System Management:  Affordability/Accessibility:   -concerns for when she hits the donut hole. We discuss options for PAP at that time if needed.    Adherence/Organization: n/a  Adverse Effects: n/a  -concerned for yeast infection on the Jardiance due to hx with invokana. Discuss signs and symptoms and what to do if these occur.     Objective     There were no vitals taken for this visit.     Labs  Lab Results   Component Value Date    BILITOT 0.6 02/23/2024    CALCIUM 9.7 02/23/2024    CO2 25 02/23/2024     02/23/2024    CREATININE 0.94 02/23/2024    GLUCOSE 160 (H) 02/23/2024    ALKPHOS 54 02/23/2024    K 3.7 02/23/2024    PROT 7.1 02/23/2024     02/23/2024    AST 15 02/23/2024    ALT 16 02/23/2024    BUN 16 02/23/2024    ANIONGAP 15 02/23/2024    ALBUMIN 4.2 02/23/2024    GFRF 63 08/21/2023     Lab Results   Component Value Date    TRIG 182 (H) 02/23/2024    CHOL 166 02/23/2024    LDLCALC 69 02/23/2024    HDL 60.6 02/23/2024     Lab Results   Component Value Date    HGBA1C 7.1 02/23/2024       Current Outpatient Medications on File Prior to Visit   Medication Sig Dispense Refill    cyanocobalamin, vitamin B-12, 200 mcg/spray spray,suspension Place 1 spray under the tongue once daily.      famotidine (Pepcid) 20 mg tablet Take 1 tablet (20 mg) by mouth 2 times a day as needed for indigestion or heartburn.      lisinopril 40 mg tablet Take 1 tablet (40 mg) by mouth once daily. 90 tablet 3    metFORMIN (Glucophage) 500 mg tablet Take 1 tablet (500 mg) by mouth once daily with a meal. 90 tablet 3    [DISCONTINUED] empagliflozin (Jardiance) 10 mg Take 1 tablet (10 mg) by mouth once daily. 30 tablet 11    [DISCONTINUED] hydroCHLOROthiazide (Microzide) 12.5 mg capsule Take 1 capsule (12.5 mg) by mouth once daily. 90 capsule 3     No current facility-administered medications on file prior to visit.        Assessment/Plan   Problem List Items Addressed This Visit       Essential hypertension     Patient is not testing blood pressures regularly at home, as they have continued to be well controlled. She states that since starting Farxiga, she has increased  urination both from that and hydrochlorothiazide. She is interested in coming off the hydrochlorothiazide. I discuss that I would like her to test her blood pressures regularly between now and our next visit to ensure pressures are well controlled prior to stopping therapy. Patient is agreeable.     Plan:  CONTINUE hydrochlorothiazide and lisinopril as currently prescribed.          Type 2 diabetes mellitus with hyperglycemia, without long-term current use of insulin ([facility]) - Primary     Patient's diabetes is mildly uncontrolled with an A1c of 7.1%(goal <7%). Patient continues to do well on Jardiance 10 mg but states that sugars are still slightly elevated. She has lost a total of 5 lbs since starting therapy.     We discuss titration of Jardiance if sugars do not reach the goal she is hoping. States they are typically 120-140 most mornings (goal ).     Plan:  INCREASE to Jardiance 25 mg daily  Rx sent to  [facility] to be mailed to patient.   CONTINUE metformin 500 mg daily as prescribed.           Relevant Medications    hydroCHLOROthiazide (Microzide) 12.5 mg capsule    empagliflozin (Jardiance) 25 mg     Pharmacy Follow Up: 4/29 @ 3:30  PM   PCP Follow Up: 6/10/2024    Rosanne Rachel, Aileen    Continue all meds under the continuation of care with the referring provider and clinical pharmacy team.

## 2024-04-23 PROCEDURE — RXMED WILLOW AMBULATORY MEDICATION CHARGE

## 2024-04-25 ENCOUNTER — PHARMACY VISIT (OUTPATIENT)
Dept: PHARMACY | Facility: CLINIC | Age: 74
End: 2024-04-25
Payer: MEDICARE

## 2024-04-29 ENCOUNTER — TELEMEDICINE (OUTPATIENT)
Dept: PHARMACY | Facility: HOSPITAL | Age: 74
End: 2024-04-29
Payer: MEDICARE

## 2024-04-29 ENCOUNTER — HOSPITAL ENCOUNTER (OUTPATIENT)
Dept: RADIOLOGY | Facility: HOSPITAL | Age: 74
Discharge: HOME | End: 2024-04-29
Payer: MEDICARE

## 2024-04-29 ENCOUNTER — LAB (OUTPATIENT)
Dept: LAB | Facility: LAB | Age: 74
End: 2024-04-29
Payer: MEDICARE

## 2024-04-29 DIAGNOSIS — E11.65 TYPE 2 DIABETES MELLITUS WITH HYPERGLYCEMIA, WITHOUT LONG-TERM CURRENT USE OF INSULIN (MULTI): Primary | ICD-10-CM

## 2024-04-29 DIAGNOSIS — I10 ESSENTIAL HYPERTENSION: ICD-10-CM

## 2024-04-29 DIAGNOSIS — Z12.39 BREAST SCREENING: ICD-10-CM

## 2024-04-29 DIAGNOSIS — Z12.31 ENCOUNTER FOR SCREENING MAMMOGRAM FOR MALIGNANT NEOPLASM OF BREAST: ICD-10-CM

## 2024-04-29 PROCEDURE — 77063 BREAST TOMOSYNTHESIS BI: CPT | Performed by: RADIOLOGY

## 2024-04-29 PROCEDURE — 77067 SCR MAMMO BI INCL CAD: CPT

## 2024-04-29 PROCEDURE — 77067 SCR MAMMO BI INCL CAD: CPT | Performed by: RADIOLOGY

## 2024-04-29 NOTE — ASSESSMENT & PLAN NOTE
Patient's diabetes is mildly uncontrolled with an A1c of 7.1%(goal <7%). Patient continues to do well on Jardiance 25 mg but has only had 4-5 doses. She has lost a total of 8 lbs since starting therapy.     Plan:  CONTINUE to Jardiance 25 mg daily  CONTINUE metformin 500 mg daily as prescribed.

## 2024-04-29 NOTE — PROGRESS NOTES
Subjective   Patient ID: Irene Jett is a 74 y.o. female who presents for Hypertension and Diabetes.    Referring Provider: Reanna Payne MD     Diabetes  She presents for her follow-up diabetic visit. She has type 2 diabetes mellitus. Her disease course has been improving. There are no hypoglycemic associated symptoms. Current diabetic treatment includes oral agent (dual therapy). An ACE inhibitor/angiotensin II receptor blocker is being taken.       Current  Pharmacotherapy:   Jardiance 25 mg daily  Metformin 500 mg daily      Historical Pharmacotherapy:  Ozempic (stopped due to constipation, GERD)  Invokana (stopped due to yeast infection, did have good glucose benefits and weight loss benefits for her though)  Sitagliptin (stopped due to swelling)    SECONDARY PREVENTION  - Statin? No  - ACE-I/ARB? Lisinopril 40 mg   - Aspirin? No    -The 10-year ASCVD risk score (Do CASTRO, et al., 2019) is: 44.3%    Values used to calculate the score:      Age: 74 years      Sex: Female      Is Non- : No      Diabetic: Yes      Tobacco smoker: No      Systolic Blood Pressure: 137 mmHg      Is BP treated: Yes      HDL Cholesterol: 60.6 mg/dL      Total Cholesterol: 166 mg/dL      Current monitoring regimen:   Patient is using: finger sticks, once daily     SMBG Fasting Readings: 120-140 in the mornings       Hypertension  Current Pharmacotherapy:   Hydrochlorothiazide 12.5 mg daily   Lisinopril 40 mg daily     Notable Historical Therapy: n/a      Current Monitoring:   Last Office BP Readin/85 (3/6/2024)    BP Cuff at Home: yes  Testing BP at Home: yes    Home BP Readings: 127/72 today    Home Blood Pressure Monitoring Education Provided: yes (3/18/24)     CKD  eGFR- 63 (2024)  SGLT-2: Jardiance 10 mg daily     Review of Systems    Medication System Management:  Affordability/Accessibility:   -concerns for when she hits the donut hole. We discuss options for PAP at that time if needed.    Adherence/Organization: n/a  Adverse Effects: n/a  -concerned for yeast infection on the Jardiance due to hx with invokana. Discuss signs and symptoms and what to do if these occur.     Objective     There were no vitals taken for this visit.     Labs  Lab Results   Component Value Date    BILITOT 0.6 02/23/2024    CALCIUM 9.7 02/23/2024    CO2 25 02/23/2024     02/23/2024    CREATININE 0.94 02/23/2024    GLUCOSE 160 (H) 02/23/2024    ALKPHOS 54 02/23/2024    K 3.7 02/23/2024    PROT 7.1 02/23/2024     02/23/2024    AST 15 02/23/2024    ALT 16 02/23/2024    BUN 16 02/23/2024    ANIONGAP 15 02/23/2024    ALBUMIN 4.2 02/23/2024    GFRF 63 08/21/2023     Lab Results   Component Value Date    TRIG 182 (H) 02/23/2024    CHOL 166 02/23/2024    LDLCALC 69 02/23/2024    HDL 60.6 02/23/2024     Lab Results   Component Value Date    HGBA1C 7.1 02/23/2024       Current Outpatient Medications on File Prior to Visit   Medication Sig Dispense Refill    cyanocobalamin, vitamin B-12, 200 mcg/spray spray,suspension Place 1 spray under the tongue once daily.      empagliflozin (Jardiance) 25 mg Take 1 tablet (25 mg) by mouth once daily. 30 tablet 1    famotidine (Pepcid) 20 mg tablet Take 1 tablet (20 mg) by mouth 2 times a day as needed for indigestion or heartburn.      lisinopril 40 mg tablet Take 1 tablet (40 mg) by mouth once daily. 90 tablet 3    metFORMIN (Glucophage) 500 mg tablet Take 1 tablet (500 mg) by mouth once daily with a meal. 90 tablet 3    [DISCONTINUED] hydroCHLOROthiazide (Microzide) 12.5 mg capsule Take 1 capsule (12.5 mg) by mouth once daily. 90 capsule 3     No current facility-administered medications on file prior to visit.        Assessment/Plan   Problem List Items Addressed This Visit       Essential hypertension     Patient notes that BP readings at home have improved significantly since starting Jardiance. Reading this morning was 124/72. States she rarely has a reading > 130/80. She is  interested in stopping hydrochlorothiazide to limit her trips to the bathroom everyday.    Plan:  STOP hydrochlorothiazide 12.5 mg daily  Continue to monitor BP for our next follow up  Call if pressures are regularly > 140/80  CONTINUE lisinopril as currently prescribed.          Type 2 diabetes mellitus with hyperglycemia, without long-term current use of insulin (Multi) - Primary     Patient's diabetes is mildly uncontrolled with an A1c of 7.1%(goal <7%). Patient continues to do well on Jardiance 25 mg but has only had 4-5 doses. She has lost a total of 8 lbs since starting therapy.     Plan:  CONTINUE to Jardiance 25 mg daily  CONTINUE metformin 500 mg daily as prescribed.              Pharmacy Follow Up: 5/20 @ 3:30 PM   PCP Follow Up: 6/10/2024    Rosanne Rachel PharmD    Continue all meds under the continuation of care with the referring provider and clinical pharmacy team.

## 2024-04-29 NOTE — ASSESSMENT & PLAN NOTE
Patient notes that BP readings at home have improved significantly since starting Jardiance. Reading this morning was 124/72. States she rarely has a reading > 130/80. She is interested in stopping hydrochlorothiazide to limit her trips to the bathroom everyday.    Plan:  STOP hydrochlorothiazide 12.5 mg daily  Continue to monitor BP for our next follow up  Call if pressures are regularly > 140/80  CONTINUE lisinopril as currently prescribed.

## 2024-05-20 ENCOUNTER — TELEPHONE (OUTPATIENT)
Dept: PRIMARY CARE | Facility: CLINIC | Age: 74
End: 2024-05-20

## 2024-05-20 ENCOUNTER — TELEMEDICINE (OUTPATIENT)
Dept: PHARMACY | Facility: HOSPITAL | Age: 74
End: 2024-05-20
Payer: MEDICARE

## 2024-05-20 DIAGNOSIS — R30.0 DYSURIA: Primary | ICD-10-CM

## 2024-05-20 DIAGNOSIS — I10 ESSENTIAL HYPERTENSION: ICD-10-CM

## 2024-05-20 DIAGNOSIS — R30.0 DYSURIA: ICD-10-CM

## 2024-05-20 DIAGNOSIS — E11.65 TYPE 2 DIABETES MELLITUS WITH HYPERGLYCEMIA, WITHOUT LONG-TERM CURRENT USE OF INSULIN (MULTI): Primary | ICD-10-CM

## 2024-05-20 RX ORDER — BLOOD-GLUCOSE METER
KIT MISCELLANEOUS
Qty: 100 EACH | Refills: 3 | Status: SHIPPED | OUTPATIENT
Start: 2024-05-20

## 2024-05-20 RX ORDER — HYDROCHLOROTHIAZIDE 12.5 MG/1
12.5 TABLET ORAL DAILY
Qty: 30 TABLET | Refills: 5 | Status: SHIPPED | OUTPATIENT
Start: 2024-05-20 | End: 2024-11-16

## 2024-05-20 NOTE — PROGRESS NOTES
Subjective   Patient ID: Irene Jett is a 74 y.o. female who presents for Diabetes and Hypertension.    Referring Provider: Reanna Payne MD     Diabetes  She presents for her follow-up diabetic visit. She has type 2 diabetes mellitus. Her disease course has been improving. There are no hypoglycemic associated symptoms. Current diabetic treatment includes oral agent (dual therapy). An ACE inhibitor/angiotensin II receptor blocker is being taken.       Current  Pharmacotherapy:   Jardiance 25 mg daily  Metformin 500 mg daily      Historical Pharmacotherapy:  Ozempic (stopped due to constipation, GERD)  Invokana (stopped due to yeast infection, did have good glucose benefits and weight loss benefits for her though)  Sitagliptin (stopped due to swelling)    SECONDARY PREVENTION  - Statin? No  - ACE-I/ARB? Lisinopril 40 mg   - Aspirin? No    -The 10-year ASCVD risk score (Do CASTRO, et al., 2019) is: 44.3%    Values used to calculate the score:      Age: 74 years      Sex: Female      Is Non- : No      Diabetic: Yes      Tobacco smoker: No      Systolic Blood Pressure: 137 mmHg      Is BP treated: Yes      HDL Cholesterol: 60.6 mg/dL      Total Cholesterol: 166 mg/dL      Current monitoring regimen:   Patient is using: finger sticks, once daily     SMBG Fasting Readings: 120-140 in the mornings       Hypertension  Current Pharmacotherapy:   Lisinopril 40 mg daily   Hydrochlorothiazide 12.5 mg daily     Notable Historical Therapy: n/a      Current Monitoring:   Last Office BP Readin/85 (3/6/2024)    BP Cuff at Home: yes  Testing BP at Home: yes    Home BP Readings: 127/72 today    Home Blood Pressure Monitoring Education Provided: yes (3/18/24)     CKD  eGFR- 63 (2024)  SGLT-2: Jardiance 10 mg daily     Review of Systems    Medication System Management:  Affordability/Accessibility:   -concerns for when she hits the donut hole. We discuss options for PAP at that time if needed.    Adherence/Organization: n/a  Adverse Effects: n/a  -concerned for yeast infection on the Jardiance due to hx with invokana. Discuss signs and symptoms and what to do if these occur.     Objective     There were no vitals taken for this visit.     Labs  Lab Results   Component Value Date    BILITOT 0.6 02/23/2024    CALCIUM 9.7 02/23/2024    CO2 25 02/23/2024     02/23/2024    CREATININE 0.94 02/23/2024    GLUCOSE 160 (H) 02/23/2024    ALKPHOS 54 02/23/2024    K 3.7 02/23/2024    PROT 7.1 02/23/2024     02/23/2024    AST 15 02/23/2024    ALT 16 02/23/2024    BUN 16 02/23/2024    ANIONGAP 15 02/23/2024    ALBUMIN 4.2 02/23/2024    GFRF 63 08/21/2023     Lab Results   Component Value Date    TRIG 182 (H) 02/23/2024    CHOL 166 02/23/2024    LDLCALC 69 02/23/2024    HDL 60.6 02/23/2024     Lab Results   Component Value Date    HGBA1C 7.1 02/23/2024       Current Outpatient Medications on File Prior to Visit   Medication Sig Dispense Refill    cyanocobalamin, vitamin B-12, 200 mcg/spray spray,suspension Place 1 spray under the tongue once daily.      empagliflozin (Jardiance) 25 mg Take 1 tablet (25 mg) by mouth once daily. 30 tablet 1    famotidine (Pepcid) 20 mg tablet Take 1 tablet (20 mg) by mouth 2 times a day as needed for indigestion or heartburn.      lisinopril 40 mg tablet Take 1 tablet (40 mg) by mouth once daily. 90 tablet 3    metFORMIN (Glucophage) 500 mg tablet Take 1 tablet (500 mg) by mouth once daily with a meal. 90 tablet 3     No current facility-administered medications on file prior to visit.        Assessment/Plan   Problem List Items Addressed This Visit       Essential hypertension     Patient notes that BP readings at home have improved significantly since starting Jardiance. Reading this morning was 124/72. Her blood pressures did rise pretty significantly when she stopped the hydrochlorothiazide, so she restarted it. Pressures have returned to normal again.     Plan:  START  hydrochlorothiazide 12.5 mg daily  CONTINUE lisinopril as currently prescribed.          Type 2 diabetes mellitus with hyperglycemia, without long-term current use of insulin (Multi) - Primary     Patient's diabetes is mildly uncontrolled with an A1c of 7.1%(goal <7%). Patient has been doing well on Jardiance but has showed symptoms of UTI/yeast infection. We discuss needing a urine culture completed and may need a short course of antibiotics or fluconazole. Patient is agreeable to urine culture. Will reach out to PCP for order for urine culture. In the meantime, will have patient cut her Jardiance in half until symptoms clear.     Plan:  CONTINUE to Jardiance 25 mg daily  Cut tablet in half until UTI symptoms resolve.   CONTINUE metformin 500 mg daily as prescribed.            Pharmacy Follow Up: 5/20 @ 3:30 PM   PCP Follow Up: 6/10/2024    Rosanne Rachel PharmD    Continue all meds under the continuation of care with the referring provider and clinical pharmacy team.

## 2024-05-20 NOTE — ASSESSMENT & PLAN NOTE
Patient's diabetes is mildly uncontrolled with an A1c of 7.1%(goal <7%). Patient has been doing well on Jardiance but has showed symptoms of UTI/yeast infection. We discuss needing a urine culture completed and may need a short course of antibiotics or fluconazole. Patient is agreeable to urine culture. Will reach out to PCP for order for urine culture. In the meantime, will have patient cut her Jardiance in half until symptoms clear.     Plan:  CONTINUE to Jardiance 25 mg daily  Cut tablet in half until UTI symptoms resolve.   CONTINUE metformin 500 mg daily as prescribed.

## 2024-05-20 NOTE — ASSESSMENT & PLAN NOTE
Patient notes that BP readings at home have improved significantly since starting Jardiance. Reading this morning was 124/72. Her blood pressures did rise pretty significantly when she stopped the hydrochlorothiazide, so she restarted it. Pressures have returned to normal again.     Plan:  START hydrochlorothiazide 12.5 mg daily  CONTINUE lisinopril as currently prescribed.

## 2024-05-20 NOTE — TELEPHONE ENCOUNTER
Called and notified patient of UA and urine culture ordered. She stated she would go to the lab tomorrow to complete. She did state she has been having some discomfort and vaginal itching since her Jardiance was increased to 25 mg daily.

## 2024-05-20 NOTE — TELEPHONE ENCOUNTER
----- Message from Reanna Payne MD sent at 5/20/2024  4:23 PM EDT -----  Regarding: RE: Possible UTI vs Yeast Infection  Please notify patient U/A and culture order sent to lab  Any discharge or vaginal itching?  ----- Message -----  From: Rosanne Rachel PharmD  Sent: 5/20/2024   3:40 PM EDT  To: Reanna Payne MD  Subject: Possible UTI vs Yeast Infection                  Hi Dr. Payne,     I had an appointment today with Irene and she thinks she is coming down with a yeast infection or UTI. Are you able to order a urine culture for her to see what is going on? She was agreeable to having it done.     Thanks!  Rosanne Rachel, EricD

## 2024-05-21 ENCOUNTER — TELEPHONE (OUTPATIENT)
Dept: PRIMARY CARE | Facility: CLINIC | Age: 74
End: 2024-05-21

## 2024-05-21 ENCOUNTER — LAB (OUTPATIENT)
Dept: LAB | Facility: LAB | Age: 74
End: 2024-05-21
Payer: MEDICARE

## 2024-05-21 DIAGNOSIS — E11.65 TYPE 2 DIABETES MELLITUS WITH HYPERGLYCEMIA, WITHOUT LONG-TERM CURRENT USE OF INSULIN (MULTI): ICD-10-CM

## 2024-05-21 DIAGNOSIS — R30.0 DYSURIA: ICD-10-CM

## 2024-05-21 LAB
APPEARANCE UR: ABNORMAL
BILIRUB UR STRIP.AUTO-MCNC: NEGATIVE MG/DL
COLOR UR: YELLOW
GLUCOSE UR STRIP.AUTO-MCNC: ABNORMAL MG/DL
KETONES UR STRIP.AUTO-MCNC: NEGATIVE MG/DL
LEUKOCYTE ESTERASE UR QL STRIP.AUTO: NEGATIVE
NITRITE UR QL STRIP.AUTO: NEGATIVE
PH UR STRIP.AUTO: 5 [PH]
PROT UR STRIP.AUTO-MCNC: NEGATIVE MG/DL
RBC # UR STRIP.AUTO: NEGATIVE /UL
SP GR UR STRIP.AUTO: 1.02
UROBILINOGEN UR STRIP.AUTO-MCNC: <2 MG/DL

## 2024-05-21 PROCEDURE — 81003 URINALYSIS AUTO W/O SCOPE: CPT

## 2024-05-21 PROCEDURE — 87086 URINE CULTURE/COLONY COUNT: CPT

## 2024-05-21 RX ORDER — METFORMIN HYDROCHLORIDE 500 MG/1
500 TABLET ORAL
Qty: 90 TABLET | Refills: 3 | Status: SHIPPED | OUTPATIENT
Start: 2024-05-21 | End: 2024-06-10 | Stop reason: SDUPTHER

## 2024-05-21 RX ORDER — FLUCONAZOLE 150 MG/1
150 TABLET ORAL ONCE
Qty: 1 TABLET | Refills: 0 | Status: SHIPPED | OUTPATIENT
Start: 2024-05-21 | End: 2024-05-21

## 2024-05-21 RX ORDER — FLUCONAZOLE 150 MG/1
150 TABLET ORAL ONCE
Qty: 1 TABLET | Refills: 0 | Status: SHIPPED | OUTPATIENT
Start: 2024-05-21 | End: 2024-05-21 | Stop reason: SDUPTHER

## 2024-05-21 NOTE — TELEPHONE ENCOUNTER
Rx Refill Request Telephone Encounter    Name:  Irene Jett  :  [id number]  Medication Name:    metFORMIN (Glucophage) 500 mg tablet        Take 1 tablet (500 mg) by mouth once daily with a meal.       Specific Pharmacy location:  [facility]  Date of last appointment:  3/6  Date of next appointment:  6/10  Best number to reach patient:  [phone]

## 2024-05-21 NOTE — TELEPHONE ENCOUNTER
Called and notified patient of Diflucan sent. She states she does not use the  Pharmacy and requested the Diflucan be sent to [facility] in [place].

## 2024-05-22 LAB — BACTERIA UR CULT: NORMAL

## 2024-05-31 ENCOUNTER — TELEPHONE (OUTPATIENT)
Dept: PRIMARY CARE | Facility: CLINIC | Age: 74
End: 2024-05-31
Payer: MEDICARE

## 2024-05-31 DIAGNOSIS — B37.9 YEAST INFECTION: Primary | ICD-10-CM

## 2024-05-31 DIAGNOSIS — E11.65 TYPE 2 DIABETES MELLITUS WITH HYPERGLYCEMIA, WITHOUT LONG-TERM CURRENT USE OF INSULIN (MULTI): ICD-10-CM

## 2024-05-31 RX ORDER — FLUCONAZOLE 150 MG/1
150 TABLET ORAL ONCE
Qty: 1 TABLET | Refills: 1 | Status: SHIPPED | OUTPATIENT
Start: 2024-05-31 | End: 2024-06-10 | Stop reason: WASHOUT

## 2024-05-31 NOTE — TELEPHONE ENCOUNTER
Patient calling with another possible yeast infection this is the second time since the dose was raised to 25 mg. She has been taking half a pill daily and still presented with symptoms of a yeast infection     Patient is requesting something to resolve the symptoms of yeast infection     Patient also wants to know if she should stop the jardiance until the yeast infection is resolved then go back on the 1/2 of the 25mg OR the 10 mg tablet .daily     The jardiance is really helping her and she does not want to stop taking it    Please advise

## 2024-06-03 ENCOUNTER — LAB (OUTPATIENT)
Dept: LAB | Facility: LAB | Age: 74
End: 2024-06-03
Payer: MEDICARE

## 2024-06-03 DIAGNOSIS — E11.65 TYPE 2 DIABETES MELLITUS WITH HYPERGLYCEMIA, WITHOUT LONG-TERM CURRENT USE OF INSULIN (MULTI): ICD-10-CM

## 2024-06-03 DIAGNOSIS — E11.65 TYPE 2 DIABETES MELLITUS WITH HYPERGLYCEMIA (MULTI): Primary | ICD-10-CM

## 2024-06-03 DIAGNOSIS — R79.89 ELEVATED TSH: ICD-10-CM

## 2024-06-03 DIAGNOSIS — N18.31 CHRONIC KIDNEY DISEASE, STAGE 3A (MULTI): ICD-10-CM

## 2024-06-03 LAB
ANION GAP SERPL CALC-SCNC: 12 MMOL/L (ref 10–20)
BUN SERPL-MCNC: 16 MG/DL (ref 6–23)
CALCIUM SERPL-MCNC: 9.5 MG/DL (ref 8.6–10.3)
CHLORIDE SERPL-SCNC: 103 MMOL/L (ref 98–107)
CO2 SERPL-SCNC: 27 MMOL/L (ref 21–32)
CREAT SERPL-MCNC: 1.06 MG/DL (ref 0.5–1.05)
EGFRCR SERPLBLD CKD-EPI 2021: 55 ML/MIN/1.73M*2
GLUCOSE SERPL-MCNC: 165 MG/DL (ref 74–99)
POTASSIUM SERPL-SCNC: 4.4 MMOL/L (ref 3.5–5.3)
SODIUM SERPL-SCNC: 138 MMOL/L (ref 136–145)
T4 FREE SERPL-MCNC: 0.87 NG/DL (ref 0.61–1.12)
THYROPEROXIDASE AB SERPL-ACNC: 48 IU/ML
TSH SERPL-ACNC: 4.92 MIU/L (ref 0.44–3.98)

## 2024-06-03 PROCEDURE — 36415 COLL VENOUS BLD VENIPUNCTURE: CPT

## 2024-06-03 PROCEDURE — 84439 ASSAY OF FREE THYROXINE: CPT

## 2024-06-03 PROCEDURE — 86376 MICROSOMAL ANTIBODY EACH: CPT

## 2024-06-03 PROCEDURE — 86800 THYROGLOBULIN ANTIBODY: CPT

## 2024-06-03 PROCEDURE — 84443 ASSAY THYROID STIM HORMONE: CPT

## 2024-06-03 PROCEDURE — 80048 BASIC METABOLIC PNL TOTAL CA: CPT

## 2024-06-04 ASSESSMENT — ENCOUNTER SYMPTOMS
VISUAL CHANGE: 0
WEIGHT LOSS: 1
POLYPHAGIA: 0
FATIGUE: 0
WEAKNESS: 0
NERVOUS/ANXIOUS: 0
HEADACHES: 0
DIZZINESS: 0
BLURRED VISION: 0
SPEECH DIFFICULTY: 0
BLACKOUTS: 0
SWEATS: 0
TREMORS: 0
POLYDIPSIA: 0
CONFUSION: 0
HUNGER: 0
SEIZURES: 0

## 2024-06-05 LAB — THYROGLOB AB SERPL-ACNC: <0.9 IU/ML (ref 0–4)

## 2024-06-06 NOTE — ASSESSMENT & PLAN NOTE
Off Ozempic  On jardiance  Had UTI on jardiance - resolved  BMP checked - slight dip in GFR expected  A1c up some - trial increase metfromin to BID - Risks, benefits and side effects reviewed with patient.   Continue current medication and work on diet  Follow up 3 months

## 2024-06-06 NOTE — ASSESSMENT & PLAN NOTE
TSH remains mildly elevated  Thyroid antibodies negative  Continue to monitor   Recheck in 3 months

## 2024-06-06 NOTE — PATIENT INSTRUCTIONS
I recommend Shingrix, new COVID booster, and Boostrix at the pharmacy.    I would like you to follow up in 6 months  Please have all labs that were ordered done at least 1 week prior to your visit.

## 2024-06-06 NOTE — PROGRESS NOTES
Subjective   Patient ID: Irene Jett is a 74 y.o. female who presents for Follow-up.  Diabetes  She has type 2 diabetes mellitus. No MedicAlert identification noted. The initial diagnosis of diabetes was made 18 years ago. Pertinent negatives for hypoglycemia include no confusion, dizziness, headaches, hunger, mood changes, nervousness/anxiousness, pallor, seizures, sleepiness, speech difficulty, sweats or tremors. Associated symptoms include polyuria and weight loss. Pertinent negatives for diabetes include no blurred vision, no chest pain, no fatigue, no foot ulcerations, no polydipsia, no polyphagia, no visual change and no weakness. Pertinent negatives for hypoglycemia complications include no blackouts, no hospitalization, no nocturnal hypoglycemia, no required assistance and no required glucagon injection. Symptoms are stable. Pertinent negatives for diabetic complications include no CVA, heart disease, impotence, peripheral neuropathy, PVD or retinopathy. Risk factors for coronary artery disease include family history, hypertension and obesity. Current diabetic treatment includes diet and oral agent (dual therapy). She is compliant with treatment all of the time. Her weight is decreasing steadily. She is following a diabetic, generally healthy and low salt diet. Meal planning includes avoidance of concentrated sweets. She has not had a previous visit with a dietitian. She participates in exercise daily. Blood glucose monitoring compliance is inadequate. There is no change in her home blood glucose trend. Her dinner blood glucose range is generally 140-180 mg/dl. Her overall blood glucose range is 140-180 mg/dl. She sees a podiatrist.Eye exam is current.     Patient presents today for follow up labs and chronic conditions.  Has yeas tinfections on 25 mg dose jardiance - decreased to 12.5 and no further problems.  Patient otherwise feels well. No other complaints or concerns.    The patient's relevant past  medical, surgical, family, and social history was reviewed in [facility].  All pertinent lab work and results for this visit were reviewed with patient.    Lab on 06/07/2024   Component Date Value Ref Range Status    Hemoglobin A1C 06/07/2024 7.6 (H)  see below % Final    Estimated Average Glucose 06/07/2024 171  Not Established mg/dL Final   Lab on 06/03/2024   Component Date Value Ref Range Status    Anti-Thyroglobulin AB 06/03/2024 <0.9  0.0 - 4.0 IU/mL Final    INTERPRETIVE INFORMATION: Thyroglobulin Antibody                                    A value of 4.0 IU/mL or less indicates a negative result for   thyroglobulin antibodies.    The Thyroglobulin Antibody assay is being performed using the   [employer] Access DxI method.  Performed By: [facility]  [address] [place] [address]  : Gabriele Mcclure MD, PhD  CLIA Number: [id number]    Thyroid Peroxidase (TPO) Antibody 06/03/2024 48  <=60 IU/mL Final    Thyroid Stimulating Hormone 06/03/2024 4.92 (H)  0.44 - 3.98 mIU/L Final    Glucose 06/03/2024 165 (H)  74 - 99 mg/dL Final    Sodium 06/03/2024 138  136 - 145 mmol/L Final    Potassium 06/03/2024 4.4  3.5 - 5.3 mmol/L Final    Chloride 06/03/2024 103  98 - 107 mmol/L Final    Bicarbonate 06/03/2024 27  21 - 32 mmol/L Final    Anion Gap 06/03/2024 12  10 - 20 mmol/L Final    Urea Nitrogen 06/03/2024 16  6 - 23 mg/dL Final    Creatinine 06/03/2024 1.06 (H)  0.50 - 1.05 mg/dL Final    eGFR 06/03/2024 55 (L)  >60 mL/min/1.73m*2 Final    Calculations of estimated GFR are performed using the 2021 CKD-EPI Study Refit equation without the race variable for the IDMS-Traceable creatinine methods.  https://jasn.asnjournals.org/content/early/2021/09/22/ASN.3370937655    Calcium 06/03/2024 9.5  8.6 - 10.3 mg/dL Final    Thyroxine, Free 06/03/2024 0.87  0.61 - 1.12 ng/dL Final   Lab on 05/21/2024   Component Date Value Ref Range Status    Color, Urine 05/21/2024 Yellow  Straw, Yellow  "Final    Appearance, Urine 05/21/2024 Hazy (N)  Clear Final    Specific Gravity, Urine 05/21/2024 1.020  1.005 - 1.035 Final    pH, Urine 05/21/2024 5.0  5.0, 5.5, 6.0, 6.5, 7.0, 7.5, 8.0 Final    Protein, Urine 05/21/2024 NEGATIVE  NEGATIVE mg/dL Final    Glucose, Urine 05/21/2024 >=500 (3+) (A)  NEGATIVE mg/dL Final    Blood, Urine 05/21/2024 NEGATIVE  NEGATIVE Final    Ketones, Urine 05/21/2024 NEGATIVE  NEGATIVE mg/dL Final    Bilirubin, Urine 05/21/2024 NEGATIVE  NEGATIVE Final    Urobilinogen, Urine 05/21/2024 <2.0  <2.0 mg/dL Final    Nitrite, Urine 05/21/2024 NEGATIVE  NEGATIVE Final    Leukocyte Esterase, Urine 05/21/2024 NEGATIVE  NEGATIVE Final    Urine Culture 05/21/2024 No significant growth   Final           Review of Systems   Constitutional:  Positive for weight loss. Negative for fatigue.   Eyes:  Negative for blurred vision.   Cardiovascular:  Negative for chest pain.   Endocrine: Positive for polyuria. Negative for polydipsia and polyphagia.   Genitourinary:  Negative for impotence.   Skin:  Negative for pallor.   Neurological:  Negative for dizziness, tremors, seizures, speech difficulty, weakness and headaches.   Psychiatric/Behavioral:  Negative for confusion. The patient is not nervous/anxious.       A complete review of systems was performed and all systems were normal except what is noted in the HPI.        Objective   /74   Pulse 66   Temp 36.1 °C (97 °F)   Ht 1.778 m (5' 10\")   Wt 90.7 kg (200 lb)   SpO2 98%   BMI 28.70 kg/m²    Physical Exam  Constitutional:       General: She is not in acute distress.     Appearance: Normal appearance.   HENT:      Head: Normocephalic and atraumatic.   Cardiovascular:      Rate and Rhythm: Normal rate and regular rhythm.      Heart sounds: Normal heart sounds.   Pulmonary:      Effort: Pulmonary effort is normal.      Breath sounds: Normal breath sounds.   Abdominal:      General: Abdomen is flat. Bowel sounds are normal.      Palpations: " Abdomen is soft.      Tenderness: There is no abdominal tenderness.   Musculoskeletal:      Right lower leg: No edema.      Left lower leg: No edema.   Neurological:      Mental Status: She is alert.   Psychiatric:         Mood and Affect: Mood normal.         Behavior: Behavior normal.         Thought Content: Thought content normal.         Health Maintenance Due   Topic Date Due    DTaP/Tdap/Td Vaccines (1 - Tdap) Never done    Zoster Vaccines (1 of 2) Never done    RSV Pregnant patients and/or  patients aged 60+ years (1 - 1-dose 60+ series) Never done    COVID-19 Vaccine (7 - 2023-24 season) 01/15/2024        Assessment/Plan   Problem List Items Addressed This Visit       Chronic kidney disease, stage 3a (Multi)     On SGLT-2 and ACE  Recheck 9/24         Essential hypertension     Well controlled. Continue current medication and recheck in 3 months.           Type 2 diabetes mellitus with hyperglycemia, without long-term current use of insulin (Multi) - Primary     Off Ozempic  On jardiance  Had UTI on jardiance - resolved  BMP checked - slight dip in GFR expected  A1c up some - trial increase metfromin to BID - Risks, benefits and side effects reviewed with patient.   Continue current medication and work on diet  Follow up 3 months         Relevant Medications    metFORMIN (Glucophage) 500 mg tablet    Elevated TSH     TSH remains mildly elevated  Thyroid antibodies negative  Continue to monitor   Recheck in 3 months               Patient understands and agrees with care plan.           Reanna Payne MD

## 2024-06-07 ENCOUNTER — LAB (OUTPATIENT)
Dept: LAB | Facility: LAB | Age: 74
End: 2024-06-07
Payer: MEDICARE

## 2024-06-07 DIAGNOSIS — N18.31 CHRONIC KIDNEY DISEASE, STAGE 3A (MULTI): ICD-10-CM

## 2024-06-07 DIAGNOSIS — E11.65 TYPE 2 DIABETES MELLITUS WITH HYPERGLYCEMIA, WITHOUT LONG-TERM CURRENT USE OF INSULIN (MULTI): ICD-10-CM

## 2024-06-07 DIAGNOSIS — R79.89 ELEVATED TSH: ICD-10-CM

## 2024-06-07 DIAGNOSIS — E11.65 TYPE 2 DIABETES MELLITUS WITH HYPERGLYCEMIA (MULTI): ICD-10-CM

## 2024-06-07 LAB
EST. AVERAGE GLUCOSE BLD GHB EST-MCNC: 171 MG/DL
HBA1C MFR BLD: 7.6 %

## 2024-06-07 PROCEDURE — 83036 HEMOGLOBIN GLYCOSYLATED A1C: CPT

## 2024-06-07 PROCEDURE — 36415 COLL VENOUS BLD VENIPUNCTURE: CPT

## 2024-06-10 ENCOUNTER — OFFICE VISIT (OUTPATIENT)
Dept: PRIMARY CARE | Facility: CLINIC | Age: 74
End: 2024-06-10
Payer: MEDICARE

## 2024-06-10 VITALS
TEMPERATURE: 97 F | WEIGHT: 200 LBS | OXYGEN SATURATION: 98 % | HEIGHT: 70 IN | HEART RATE: 66 BPM | BODY MASS INDEX: 28.63 KG/M2 | DIASTOLIC BLOOD PRESSURE: 74 MMHG | SYSTOLIC BLOOD PRESSURE: 120 MMHG

## 2024-06-10 DIAGNOSIS — E11.65 TYPE 2 DIABETES MELLITUS WITH HYPERGLYCEMIA, WITHOUT LONG-TERM CURRENT USE OF INSULIN (MULTI): Primary | ICD-10-CM

## 2024-06-10 DIAGNOSIS — R79.89 ELEVATED TSH: ICD-10-CM

## 2024-06-10 DIAGNOSIS — I10 ESSENTIAL HYPERTENSION: ICD-10-CM

## 2024-06-10 DIAGNOSIS — N18.31 CHRONIC KIDNEY DISEASE, STAGE 3A (MULTI): ICD-10-CM

## 2024-06-10 PROBLEM — M85.80 OSTEOPENIA: Status: RESOLVED | Noted: 2023-04-25 | Resolved: 2024-06-10

## 2024-06-10 PROCEDURE — 1157F ADVNC CARE PLAN IN RCRD: CPT | Performed by: FAMILY MEDICINE

## 2024-06-10 PROCEDURE — 1160F RVW MEDS BY RX/DR IN RCRD: CPT | Performed by: FAMILY MEDICINE

## 2024-06-10 PROCEDURE — 3074F SYST BP LT 130 MM HG: CPT | Performed by: FAMILY MEDICINE

## 2024-06-10 PROCEDURE — 1036F TOBACCO NON-USER: CPT | Performed by: FAMILY MEDICINE

## 2024-06-10 PROCEDURE — 99214 OFFICE O/P EST MOD 30 MIN: CPT | Performed by: FAMILY MEDICINE

## 2024-06-10 PROCEDURE — 1123F ACP DISCUSS/DSCN MKR DOCD: CPT | Performed by: FAMILY MEDICINE

## 2024-06-10 PROCEDURE — G2211 COMPLEX E/M VISIT ADD ON: HCPCS | Performed by: FAMILY MEDICINE

## 2024-06-10 PROCEDURE — 3078F DIAST BP <80 MM HG: CPT | Performed by: FAMILY MEDICINE

## 2024-06-10 PROCEDURE — 1159F MED LIST DOCD IN RCRD: CPT | Performed by: FAMILY MEDICINE

## 2024-06-10 RX ORDER — METFORMIN HYDROCHLORIDE 500 MG/1
500 TABLET ORAL
Qty: 180 TABLET | Refills: 3 | Status: SHIPPED | OUTPATIENT
Start: 2024-06-10 | End: 2025-06-10

## 2024-06-10 ASSESSMENT — PATIENT HEALTH QUESTIONNAIRE - PHQ9
1. LITTLE INTEREST OR PLEASURE IN DOING THINGS: NOT AT ALL
SUM OF ALL RESPONSES TO PHQ9 QUESTIONS 1 AND 2: 0
2. FEELING DOWN, DEPRESSED OR HOPELESS: NOT AT ALL

## 2024-06-10 ASSESSMENT — ENCOUNTER SYMPTOMS
CONFUSION: 0
BLACKOUTS: 0
VISUAL CHANGE: 0
HUNGER: 0
POLYDIPSIA: 0
WEAKNESS: 0
POLYPHAGIA: 0
WEIGHT LOSS: 1
NERVOUS/ANXIOUS: 0
SPEECH DIFFICULTY: 0
SEIZURES: 0
SWEATS: 0
HEADACHES: 0
BLURRED VISION: 0
DIZZINESS: 0
FATIGUE: 0
TREMORS: 0

## 2024-06-17 ENCOUNTER — APPOINTMENT (OUTPATIENT)
Dept: PHARMACY | Facility: HOSPITAL | Age: 74
End: 2024-06-17
Payer: MEDICARE

## 2024-06-17 DIAGNOSIS — E11.65 TYPE 2 DIABETES MELLITUS WITH HYPERGLYCEMIA, WITHOUT LONG-TERM CURRENT USE OF INSULIN (MULTI): Primary | ICD-10-CM

## 2024-06-17 PROCEDURE — RXMED WILLOW AMBULATORY MEDICATION CHARGE

## 2024-06-17 RX ORDER — IBUPROFEN 200 MG
CAPSULE ORAL
Qty: 50 EACH | Refills: 1 | Status: SHIPPED | OUTPATIENT
Start: 2024-06-17

## 2024-06-17 NOTE — PROGRESS NOTES
Subjective   Patient ID: Irene Jett is a 74 y.o. female who presents for Diabetes and Hypertension.    Referring Provider: Reanna Payne MD   PCP Follow Up: 2024    Diabetes  She presents for her follow-up diabetic visit. She has type 2 diabetes mellitus. Her disease course has been improving. There are no hypoglycemic associated symptoms. Current diabetic treatment includes oral agent (dual therapy). An ACE inhibitor/angiotensin II receptor blocker is being taken.       Current  Pharmacotherapy:   Jardiance 12.5 mg daily  Metformin 500 mg BID     Historical Pharmacotherapy:  Ozempic (stopped due to constipation, GERD)  Invokana (stopped due to yeast infection, did have good glucose benefits and weight loss benefits for her though)  Sitagliptin (stopped due to swelling)    SECONDARY PREVENTION  - Statin? No  - ACE-I/ARB? Lisinopril 40 mg   - Aspirin? No    -The 10-year ASCVD risk score (Do CASTRO, et al., 2019) is: 36.1%    Values used to calculate the score:      Age: 74 years      Sex: Female      Is Non- : No      Diabetic: Yes      Tobacco smoker: No      Systolic Blood Pressure: 120 mmHg      Is BP treated: Yes      HDL Cholesterol: 60.6 mg/dL      Total Cholesterol: 166 mg/dL      Current monitoring regimen:   Patient is using: finger sticks, once daily     SMBG Fasting Readings: 120-140 in the mornings       Hypertension  Current Pharmacotherapy:   Lisinopril 40 mg daily   Hydrochlorothiazide 12.5 mg daily     Notable Historical Therapy: n/a      Current Monitoring:   Last Office BP Readin/74 (6/10/2024)    BP Cuff at Home: yes  Testing BP at Home: yes    Home BP Readings: 127/72 today    Home Blood Pressure Monitoring Education Provided: yes (3/18/24)     CKD  eGFR- 63 (2024)  SGLT-2: Jardiance 12.5 mg daily     Review of Systems    Medication System Management:  Affordability/Accessibility:   -concerns for when she hits the donut hole. We discuss options for  PAP at that time if needed.   Adherence/Organization: n/a  Adverse Effects: n/a  -concerned for yeast infection on the Jardiance due to hx with invokana. Discuss signs and symptoms and what to do if these occur.     Objective     There were no vitals taken for this visit.     Labs  Lab Results   Component Value Date    BILITOT 0.6 02/23/2024    CALCIUM 9.5 06/03/2024    CO2 27 06/03/2024     06/03/2024    CREATININE 1.06 (H) 06/03/2024    GLUCOSE 165 (H) 06/03/2024    ALKPHOS 54 02/23/2024    K 4.4 06/03/2024    PROT 7.1 02/23/2024     06/03/2024    AST 15 02/23/2024    ALT 16 02/23/2024    BUN 16 06/03/2024    ANIONGAP 12 06/03/2024    ALBUMIN 4.2 02/23/2024    GFRF 63 08/21/2023     Lab Results   Component Value Date    TRIG 182 (H) 02/23/2024    CHOL 166 02/23/2024    LDLCALC 69 02/23/2024    HDL 60.6 02/23/2024     Lab Results   Component Value Date    HGBA1C 7.6 (H) 06/07/2024       Current Outpatient Medications on File Prior to Visit   Medication Sig Dispense Refill    cyanocobalamin, vitamin B-12, 200 mcg/spray spray,suspension Place 1 spray under the tongue once daily.      famotidine (Pepcid) 20 mg tablet Take 1 tablet (20 mg) by mouth 2 times a day as needed for indigestion or heartburn.      hydroCHLOROthiazide (Microzide) 12.5 mg tablet Take 1 tablet (12.5 mg) by mouth once daily. 30 tablet 5    lisinopril 40 mg tablet Take 1 tablet (40 mg) by mouth once daily. 90 tablet 3    metFORMIN (Glucophage) 500 mg tablet Take 1 tablet (500 mg) by mouth 2 times daily (morning and late afternoon). 180 tablet 3    [DISCONTINUED] empagliflozin (Jardiance) 25 mg Take 0.5 tablets (12.5 mg) by mouth once daily.      [DISCONTINUED] FreeStyle Lite Strips strip Use to test blood sugars once daily as directed 100 each 3     No current facility-administered medications on file prior to visit.        Assessment/Plan   Problem List Items Addressed This Visit       Type 2 diabetes mellitus with hyperglycemia,  without long-term current use of insulin (Multi) - Primary     Patient's diabetes is uncontrolled with an A1c of 7.6%(goal <7%).    Patient has been doing 0.5 tablets of Jardiance 25 mg due to recurrent yeast infections. She thinks she may have another now but thinks it could also be a scratch. She will call the office symptoms due not improve in the next few days. We are going to decrease Jardiance down to 10 mg, as patient would prefer to stay on the medication if possible. She had mild diarrhea with her metformin increase but states this has improved.     She attempted to  her test strips but the cost was $90. Will try to send for a generic prescription to see if there is brand better covered.      Plan:  DECREASE to Jardiance 10 mg daily  CONTINUE metformin 500 mg BID as prescribed.           Relevant Medications    empagliflozin (Jardiance) 10 mg    blood sugar diagnostic (Blood Glucose Test) strip     Pharmacy Follow Up: 7/15 @ 3:30 PM       Rosanne Rachel, PharmD    Continue all meds under the continuation of care with the referring provider and clinical pharmacy team.

## 2024-06-17 NOTE — ASSESSMENT & PLAN NOTE
Patient's diabetes is uncontrolled with an A1c of 7.6%(goal <7%).    Patient has been doing 0.5 tablets of Jardiance 25 mg due to recurrent yeast infections. She thinks she may have another now but thinks it could also be a scratch. She will call the office symptoms due not improve in the next few days. We are going to decrease Jardiance down to 10 mg, as patient would prefer to stay on the medication if possible. She had mild diarrhea with her metformin increase but states this has improved.     She attempted to  her test strips but the cost was $90. Will try to send for a generic prescription to see if there is brand better covered.      Plan:  DECREASE to Jardiance 10 mg daily  CONTINUE metformin 500 mg BID as prescribed.

## 2024-06-19 ENCOUNTER — TELEPHONE (OUTPATIENT)
Dept: PRIMARY CARE | Facility: CLINIC | Age: 74
End: 2024-06-19
Payer: MEDICARE

## 2024-06-19 DIAGNOSIS — B37.49 CANDIDA INFECTION OF GENITAL REGION: ICD-10-CM

## 2024-06-19 NOTE — TELEPHONE ENCOUNTER
Rx Refill Request Telephone Encounter    Name:  Irene Jett  :  [id number]  Medication Name:      luconazole (Diflucan) 150 mg tablet [[id number]]  take 1 tab by mouth 1 time for 1 dose                Specific Pharmacy location:  [facility] in [place]  Date of last appointment:  06/10/2024  Date of next appointment:  2024  Best number to reach patient:  [phone]

## 2024-06-20 ENCOUNTER — PHARMACY VISIT (OUTPATIENT)
Dept: PHARMACY | Facility: CLINIC | Age: 74
End: 2024-06-20
Payer: MEDICARE

## 2024-06-20 RX ORDER — FLUCONAZOLE 150 MG/1
150 TABLET ORAL ONCE
Qty: 1 TABLET | Refills: 0 | Status: SHIPPED | OUTPATIENT
Start: 2024-06-20 | End: 2024-06-23

## 2024-06-20 RX ORDER — FLUCONAZOLE 150 MG/1
150 TABLET ORAL ONCE
Qty: 1 TABLET | Refills: 0 | Status: SHIPPED | OUTPATIENT
Start: 2024-06-20 | End: 2024-06-20 | Stop reason: SDUPTHER

## 2024-06-20 RX ORDER — FLUCONAZOLE 150 MG/1
150 TABLET ORAL ONCE
COMMUNITY
End: 2024-06-20 | Stop reason: SDUPTHER

## 2024-06-20 NOTE — TELEPHONE ENCOUNTER
Patient requesting this be sent to the [facility] instead of [facility].

## 2024-06-21 PROCEDURE — RXMED WILLOW AMBULATORY MEDICATION CHARGE

## 2024-06-22 ENCOUNTER — PHARMACY VISIT (OUTPATIENT)
Dept: PHARMACY | Facility: CLINIC | Age: 74
End: 2024-06-22
Payer: MEDICARE

## 2024-07-08 ENCOUNTER — TELEPHONE (OUTPATIENT)
Dept: PRIMARY CARE | Facility: CLINIC | Age: 74
End: 2024-07-08
Payer: MEDICARE

## 2024-07-08 DIAGNOSIS — B37.31 YEAST VAGINITIS: Primary | ICD-10-CM

## 2024-07-08 RX ORDER — FLUCONAZOLE 150 MG/1
150 TABLET ORAL ONCE
Qty: 1 TABLET | Refills: 0 | Status: SHIPPED | OUTPATIENT
Start: 2024-07-08 | End: 2024-07-08

## 2024-07-08 NOTE — TELEPHONE ENCOUNTER
Patient of     Patient called in and reported symptoms of a yeast infection that started on Friday. She stated this is the 3rd time this has happened since being on Jardiance, even after her dosage was decreased. Patient is wanting to know if an antibiotic can be sent over to the  in [place], and if she should continue taking this med. Please advise.

## 2024-07-08 NOTE — TELEPHONE ENCOUNTER
Will treat with diflucan   I advise she consult with Elemr at upcoming appt in September   Can switch to glipizide if yeast infection reoccurs before then

## 2024-07-15 ENCOUNTER — APPOINTMENT (OUTPATIENT)
Dept: PHARMACY | Facility: HOSPITAL | Age: 74
End: 2024-07-15
Payer: MEDICARE

## 2024-07-15 DIAGNOSIS — E11.65 TYPE 2 DIABETES MELLITUS WITH HYPERGLYCEMIA, WITHOUT LONG-TERM CURRENT USE OF INSULIN (MULTI): Primary | ICD-10-CM

## 2024-07-15 PROCEDURE — RXMED WILLOW AMBULATORY MEDICATION CHARGE

## 2024-07-15 RX ORDER — IBUPROFEN 200 MG
CAPSULE ORAL
Qty: 50 EACH | Refills: 1 | Status: SHIPPED | OUTPATIENT
Start: 2024-07-15

## 2024-07-15 RX ORDER — DEXTROSE 4 G
TABLET,CHEWABLE ORAL
Qty: 1 EACH | Refills: 0 | Status: SHIPPED | OUTPATIENT
Start: 2024-07-15

## 2024-07-15 RX ORDER — LANCETS 33 GAUGE
EACH MISCELLANEOUS
Qty: 100 EACH | Refills: 1 | Status: SHIPPED | OUTPATIENT
Start: 2024-07-15

## 2024-07-15 NOTE — ASSESSMENT & PLAN NOTE
Patient's diabetes is uncontrolled with an A1c of 7.6%(goal <7%).    Patient has been doing Jardiance 10 mg due to recurrent yeast infections. Was treated for a third infection in July shortly after our last visit. We discuss that this is most likely a side effect of Jardiance. Patient would like to try a month of the Jardiance 10 mg before stopping therapy. Her other options at this point are Farxiga, glipizide and basal insulin.       Plan:  DECREASE to Jardiance 10 mg daily  CONTINUE metformin 500 mg BID as prescribed.

## 2024-07-15 NOTE — PROGRESS NOTES
Subjective   Patient ID: Irene Jett is a 74 y.o. female who presents for Diabetes.    Referring Provider: Reanna Payne MD   PCP Follow Up: 2024    Diabetes  She presents for her follow-up diabetic visit. She has type 2 diabetes mellitus. Her disease course has been improving. There are no hypoglycemic associated symptoms. Current diabetic treatment includes oral agent (dual therapy). An ACE inhibitor/angiotensin II receptor blocker is being taken.       Current  Pharmacotherapy:   Jardiance 10 mg daily  Metformin 500 mg BID     Historical Pharmacotherapy:  Ozempic (stopped due to constipation, GERD)  Invokana (stopped due to yeast infection, did have good glucose benefits and weight loss benefits for her though)  Sitagliptin (stopped due to swelling)    SECONDARY PREVENTION  - Statin? No  - ACE-I/ARB? Lisinopril 40 mg   - Aspirin? No    -The 10-year ASCVD risk score (Do CASTRO, et al., 2019) is: 36.1%    Values used to calculate the score:      Age: 74 years      Sex: Female      Is Non- : No      Diabetic: Yes      Tobacco smoker: No      Systolic Blood Pressure: 120 mmHg      Is BP treated: Yes      HDL Cholesterol: 60.6 mg/dL      Total Cholesterol: 166 mg/dL      Current monitoring regimen:   Patient is using: finger sticks, once daily     SMBG Fasting Readings: 120-140 in the mornings       Hypertension  Current Pharmacotherapy:   Lisinopril 40 mg daily   Hydrochlorothiazide 12.5 mg daily     Notable Historical Therapy: n/a      Current Monitoring:   Last Office BP Readin/74 (6/10/2024)    BP Cuff at Home: yes  Testing BP at Home: yes    Home BP Readings: 127/72 today    Home Blood Pressure Monitoring Education Provided: yes (3/18/24)     CKD  eGFR- 63 (2024)  SGLT-2: Jardiance 12.5 mg daily     Review of Systems    Medication System Management:  Affordability/Accessibility:   -concerns for when she hits the donut hole. We discuss options for PAP at that time if  needed.   Adherence/Organization: n/a  Adverse Effects: n/a  -concerned for yeast infection on the Jardiance due to hx with invokana. Discuss signs and symptoms and what to do if these occur.     Objective     There were no vitals taken for this visit.     Labs  Lab Results   Component Value Date    BILITOT 0.6 2024    CALCIUM 9.5 2024    CO2 27 2024     2024    CREATININE 1.06 (H) 2024    GLUCOSE 165 (H) 2024    ALKPHOS 54 2024    K 4.4 2024    PROT 7.1 2024     2024    AST 15 2024    ALT 16 2024    BUN 16 2024    ANIONGAP 12 2024    ALBUMIN 4.2 2024    GFRF 63 2023     Lab Results   Component Value Date    TRIG 182 (H) 2024    CHOL 166 2024    LDLCALC 69 2024    HDL 60.6 2024     Lab Results   Component Value Date    HGBA1C 7.6 (H) 2024       Current Outpatient Medications on File Prior to Visit   Medication Sig Dispense Refill    cyanocobalamin, vitamin B-12, 200 mcg/spray spray,suspension Place 1 spray under the tongue once daily.      famotidine (Pepcid) 20 mg tablet Take 1 tablet (20 mg) by mouth 2 times a day as needed for indigestion or heartburn.      [] fluconazole (Diflucan) 150 mg tablet Take 1 tablet (150 mg) by mouth 1 time for 1 dose. 1 tablet 0    hydroCHLOROthiazide (Microzide) 12.5 mg tablet Take 1 tablet (12.5 mg) by mouth once daily. 30 tablet 5    lisinopril 40 mg tablet Take 1 tablet (40 mg) by mouth once daily. 90 tablet 3    metFORMIN (Glucophage) 500 mg tablet Take 1 tablet (500 mg) by mouth 2 times daily (morning and late afternoon). 180 tablet 3    [DISCONTINUED] blood sugar diagnostic (Blood Glucose Test) strip Use to test blood sugars once daily in the morning before breakfast. 50 each 1    [DISCONTINUED] empagliflozin (Jardiance) 10 mg Take 1 tablet (10 mg) by mouth once daily. 30 tablet 1     No current facility-administered medications on  file prior to visit.        Assessment/Plan   Problem List Items Addressed This Visit       Type 2 diabetes mellitus with hyperglycemia, without long-term current use of insulin (Multi) - Primary     Patient's diabetes is uncontrolled with an A1c of 7.6%(goal <7%).    Patient has been doing Jardiance 10 mg due to recurrent yeast infections. Was treated for a third infection in July shortly after our last visit. We discuss that this is most likely a side effect of Jardiance. Patient would like to try a month of the Jardiance 10 mg before stopping therapy. Her other options at this point are Farxiga, glipizide and basal insulin.       Plan:  DECREASE to Jardiance 10 mg daily  CONTINUE metformin 500 mg BID as prescribed.           Relevant Medications    blood sugar diagnostic (Blood Glucose Test) strip    empagliflozin (Jardiance) 10 mg    Other Relevant Orders    Follow Up In Clinical Pharmacy       Pharmacy Follow Up: 8/12 @ 3:30 PM       Rosanne Rachel, PharmD    Continue all meds under the continuation of care with the referring provider and clinical pharmacy team.

## 2024-07-17 ENCOUNTER — PHARMACY VISIT (OUTPATIENT)
Dept: PHARMACY | Facility: CLINIC | Age: 74
End: 2024-07-17
Payer: MEDICARE

## 2024-07-25 ENCOUNTER — TELEPHONE (OUTPATIENT)
Dept: PRIMARY CARE | Facility: CLINIC | Age: 74
End: 2024-07-25
Payer: MEDICARE

## 2024-07-25 DIAGNOSIS — E11.65 TYPE 2 DIABETES MELLITUS WITH HYPERGLYCEMIA, WITHOUT LONG-TERM CURRENT USE OF INSULIN (MULTI): ICD-10-CM

## 2024-07-25 NOTE — TELEPHONE ENCOUNTER
Rx Refill Request Telephone Encounter    Name:  Irene Jett  :  [id number]  Medication Name:    Patient switched pharmacies     metFORMIN (Glucophage) 500 mg tablet   Route: Take 1 tablet (500 mg) by mouth 2 times daily (morning and late afternoon).    Specific Pharmacy location:  [facility]   Date of last appointment:  6/10/24  Date of next appointment:  24  Best number to reach patient: [phone]

## 2024-07-26 PROCEDURE — RXMED WILLOW AMBULATORY MEDICATION CHARGE

## 2024-07-26 RX ORDER — METFORMIN HYDROCHLORIDE 500 MG/1
500 TABLET ORAL
Qty: 180 TABLET | Refills: 1 | Status: SHIPPED | OUTPATIENT
Start: 2024-07-26 | End: 2025-07-26

## 2024-07-27 ENCOUNTER — PHARMACY VISIT (OUTPATIENT)
Dept: PHARMACY | Facility: CLINIC | Age: 74
End: 2024-07-27
Payer: MEDICARE

## 2024-08-09 PROCEDURE — RXMED WILLOW AMBULATORY MEDICATION CHARGE

## 2024-08-12 ENCOUNTER — APPOINTMENT (OUTPATIENT)
Dept: PHARMACY | Facility: HOSPITAL | Age: 74
End: 2024-08-12
Payer: MEDICARE

## 2024-08-12 DIAGNOSIS — E11.65 TYPE 2 DIABETES MELLITUS WITH HYPERGLYCEMIA, WITHOUT LONG-TERM CURRENT USE OF INSULIN (MULTI): ICD-10-CM

## 2024-08-12 PROCEDURE — RXMED WILLOW AMBULATORY MEDICATION CHARGE

## 2024-08-12 RX ORDER — GLIMEPIRIDE 1 MG/1
1 TABLET ORAL
Qty: 30 TABLET | Refills: 1 | Status: SHIPPED | OUTPATIENT
Start: 2024-08-12 | End: 2025-08-12

## 2024-08-12 RX ORDER — METFORMIN HYDROCHLORIDE 500 MG/1
500 TABLET ORAL DAILY
Qty: 180 TABLET | Refills: 1 | Status: SHIPPED | OUTPATIENT
Start: 2024-08-12 | End: 2025-08-12

## 2024-08-12 NOTE — PROGRESS NOTES
Subjective   Patient ID: Irene Jett is a 74 y.o. female who presents for Diabetes and Hypertension.    Referring Provider: Reanna Payne MD   PCP Follow Up: 2024    Diabetes  She presents for her follow-up diabetic visit. She has type 2 diabetes mellitus. Her disease course has been improving. There are no hypoglycemic associated symptoms. Current diabetic treatment includes oral agent (dual therapy). An ACE inhibitor/angiotensin II receptor blocker is being taken.       Current  Pharmacotherapy:   Jardiance 10 mg daily  Metformin 500 mg BID     Historical Pharmacotherapy:  Ozempic (stopped due to constipation, GERD)  Invokana (stopped due to yeast infection, did have good glucose benefits and weight loss benefits for her though)  Sitagliptin (stopped due to swelling)    SECONDARY PREVENTION  - Statin? No  - ACE-I/ARB? Lisinopril 40 mg   - Aspirin? No    -The 10-year ASCVD risk score (Do CASTRO, et al., 2019) is: 36.1%    Values used to calculate the score:      Age: 74 years      Sex: Female      Is Non- : No      Diabetic: Yes      Tobacco smoker: No      Systolic Blood Pressure: 120 mmHg      Is BP treated: Yes      HDL Cholesterol: 60.6 mg/dL      Total Cholesterol: 166 mg/dL      Current monitoring regimen:   Patient is using: finger sticks, once daily     SMBG Fasting Readings: 130's at the lowest       Hypertension  Current Pharmacotherapy:   Lisinopril 40 mg daily   Hydrochlorothiazide 12.5 mg daily     Notable Historical Therapy: n/a      Current Monitoring:   Last Office BP Readin/74 (6/10/2024)    BP Cuff at Home: yes  Testing BP at Home: yes    Home BP Readings: 120/70 on the daily    Home Blood Pressure Monitoring Education Provided: yes (3/18/24)     CKD  eGFR- 63 (2024)  SGLT-2: Jardiance 10 mg daily     Review of Systems    Medication System Management:  Affordability/Accessibility:   -concerns for when she hits the donut hole. We discuss options for  PAP at that time if needed.   Adherence/Organization: n/a  Adverse Effects: n/a  -concerned for yeast infection on the Jardiance due to hx with invokana. Discuss signs and symptoms and what to do if these occur.     Objective     There were no vitals taken for this visit.     Labs  Lab Results   Component Value Date    BILITOT 0.6 02/23/2024    CALCIUM 9.5 06/03/2024    CO2 27 06/03/2024     06/03/2024    CREATININE 1.06 (H) 06/03/2024    GLUCOSE 165 (H) 06/03/2024    ALKPHOS 54 02/23/2024    K 4.4 06/03/2024    PROT 7.1 02/23/2024     06/03/2024    AST 15 02/23/2024    ALT 16 02/23/2024    BUN 16 06/03/2024    ANIONGAP 12 06/03/2024    ALBUMIN 4.2 02/23/2024    GFRF 63 08/21/2023     Lab Results   Component Value Date    TRIG 182 (H) 02/23/2024    CHOL 166 02/23/2024    LDLCALC 69 02/23/2024    HDL 60.6 02/23/2024     Lab Results   Component Value Date    HGBA1C 7.6 (H) 06/07/2024       Current Outpatient Medications on File Prior to Visit   Medication Sig Dispense Refill    blood sugar diagnostic (Blood Glucose Test) strip Use to test blood sugars once daily in the morning before breakfast. 50 each 1    blood-glucose meter misc Use to test sugars once daily 1 each 0    cyanocobalamin, vitamin B-12, 200 mcg/spray spray,suspension Place 1 spray under the tongue once daily.      empagliflozin (Jardiance) 10 mg Take 1 tablet (10 mg) by mouth once daily. 30 tablet 1    famotidine (Pepcid) 20 mg tablet Take 1 tablet (20 mg) by mouth 2 times a day as needed for indigestion or heartburn.      hydroCHLOROthiazide (Microzide) 12.5 mg tablet Take 1 tablet (12.5 mg) by mouth once daily. 30 tablet 5    lancets 33 gauge misc Use to test blood sugars once daily 100 each 1    lisinopril 40 mg tablet Take 1 tablet (40 mg) by mouth once daily. 90 tablet 3    [DISCONTINUED] metFORMIN (Glucophage) 500 mg tablet Take 1 tablet (500 mg) by mouth 2 times daily (morning and late afternoon). 180 tablet 1     No current  facility-administered medications on file prior to visit.        Assessment/Plan   Problem List Items Addressed This Visit       Type 2 diabetes mellitus with hyperglycemia, without long-term current use of insulin (Multi)     Patient's diabetes is uncontrolled with an A1c of 7.6%(goal <7%).    Patient is doing well at today's follow up, stating that she has had no further concerns for yeast infections since our last visit. She has also continued to lose weight.   She had metformin increased to BID and has noted significant increases in GI upset. She would prefer to start another agent to replace metformin. Given AE to Ozempic and Januvia and maxed dose of Jaridance, a sulfonylurea may be a good option for her. She is agreeable.       Plan:  START glimepiride 1 mg QAM  Counseled patient on MOA, expectations, side effects, duration of therapy, contraindications, administration techniques, and monitoring parameters  Answered all other questions and concerns  Rx to  [place].  DECREASE to metformin 500 mg daily (PM)  CONTINUE Jardiance 10 mg daily             Relevant Medications    metFORMIN (Glucophage) 500 mg tablet    glimepiride (Amaryl) 1 mg tablet    Other Relevant Orders    Follow Up In Clinical Pharmacy       Pharmacy Follow Up: 9/16 @ 3:30 PM       Rosanne Rachel PharmD    Continue all meds under the continuation of care with the referring provider and clinical pharmacy team.

## 2024-08-12 NOTE — ASSESSMENT & PLAN NOTE
Patient's diabetes is uncontrolled with an A1c of 7.6%(goal <7%).    Patient is doing well at today's follow up, stating that she has had no further concerns for yeast infections since our last visit. She has also continued to lose weight.   She had metformin increased to BID and has noted significant increases in GI upset. She would prefer to start another agent to replace metformin. Given AE to Ozempic and Januvia and maxed dose of Jaridance, a sulfonylurea may be a good option for her. She is agreeable.       Plan:  START glimepiride 1 mg QAM  Counseled patient on MOA, expectations, side effects, duration of therapy, contraindications, administration techniques, and monitoring parameters  Answered all other questions and concerns  Rx to  [place].  DECREASE to metformin 500 mg daily (PM)  CONTINUE Jardiance 10 mg daily

## 2024-08-13 ENCOUNTER — PHARMACY VISIT (OUTPATIENT)
Dept: PHARMACY | Facility: CLINIC | Age: 74
End: 2024-08-13
Payer: MEDICARE

## 2024-08-15 ENCOUNTER — PHARMACY VISIT (OUTPATIENT)
Dept: PHARMACY | Facility: CLINIC | Age: 74
End: 2024-08-15
Payer: MEDICARE

## 2024-09-03 ENCOUNTER — LAB (OUTPATIENT)
Dept: LAB | Facility: LAB | Age: 74
End: 2024-09-03
Payer: MEDICARE

## 2024-09-03 DIAGNOSIS — E11.65 TYPE 2 DIABETES MELLITUS WITH HYPERGLYCEMIA, WITHOUT LONG-TERM CURRENT USE OF INSULIN (MULTI): ICD-10-CM

## 2024-09-03 DIAGNOSIS — E78.1 ESSENTIAL HYPERTRIGLYCERIDEMIA: ICD-10-CM

## 2024-09-03 DIAGNOSIS — N18.31 CHRONIC KIDNEY DISEASE, STAGE 3A (MULTI): ICD-10-CM

## 2024-09-03 DIAGNOSIS — I10 ESSENTIAL HYPERTENSION: ICD-10-CM

## 2024-09-03 DIAGNOSIS — R79.89 ELEVATED TSH: ICD-10-CM

## 2024-09-03 LAB
ALBUMIN SERPL BCP-MCNC: 4.4 G/DL (ref 3.4–5)
ALP SERPL-CCNC: 58 U/L (ref 33–136)
ALT SERPL W P-5'-P-CCNC: 12 U/L (ref 7–45)
ANION GAP SERPL CALC-SCNC: 12 MMOL/L (ref 10–20)
AST SERPL W P-5'-P-CCNC: 13 U/L (ref 9–39)
BILIRUB SERPL-MCNC: 0.7 MG/DL (ref 0–1.2)
BUN SERPL-MCNC: 18 MG/DL (ref 6–23)
CALCIUM SERPL-MCNC: 9.1 MG/DL (ref 8.6–10.3)
CHLORIDE SERPL-SCNC: 103 MMOL/L (ref 98–107)
CHOLEST SERPL-MCNC: 186 MG/DL (ref 0–199)
CHOLESTEROL/HDL RATIO: 3.5
CO2 SERPL-SCNC: 28 MMOL/L (ref 21–32)
CREAT SERPL-MCNC: 0.94 MG/DL (ref 0.5–1.05)
CREAT UR-MCNC: 95.1 MG/DL (ref 20–320)
EGFRCR SERPLBLD CKD-EPI 2021: 63 ML/MIN/1.73M*2
EST. AVERAGE GLUCOSE BLD GHB EST-MCNC: 166 MG/DL
GLUCOSE SERPL-MCNC: 137 MG/DL (ref 74–99)
HBA1C MFR BLD: 7.4 %
HDLC SERPL-MCNC: 52.5 MG/DL
LDLC SERPL CALC-MCNC: 92 MG/DL
LDLC SERPL DIRECT ASSAY-MCNC: 97 MG/DL (ref 0–129)
MICROALBUMIN UR-MCNC: <7 MG/L
MICROALBUMIN/CREAT UR: NORMAL MG/G{CREAT}
NON HDL CHOLESTEROL: 134 MG/DL (ref 0–149)
POTASSIUM SERPL-SCNC: 3.9 MMOL/L (ref 3.5–5.3)
PROT SERPL-MCNC: 6.7 G/DL (ref 6.4–8.2)
SODIUM SERPL-SCNC: 139 MMOL/L (ref 136–145)
TRIGL SERPL-MCNC: 210 MG/DL (ref 0–149)
TSH SERPL-ACNC: 3.65 MIU/L (ref 0.44–3.98)
VLDL: 42 MG/DL (ref 0–40)

## 2024-09-03 PROCEDURE — 83036 HEMOGLOBIN GLYCOSYLATED A1C: CPT

## 2024-09-03 PROCEDURE — 83721 ASSAY OF BLOOD LIPOPROTEIN: CPT

## 2024-09-03 PROCEDURE — 82570 ASSAY OF URINE CREATININE: CPT

## 2024-09-03 PROCEDURE — 82043 UR ALBUMIN QUANTITATIVE: CPT

## 2024-09-03 PROCEDURE — 36415 COLL VENOUS BLD VENIPUNCTURE: CPT

## 2024-09-03 PROCEDURE — 84443 ASSAY THYROID STIM HORMONE: CPT

## 2024-09-03 PROCEDURE — 80061 LIPID PANEL: CPT

## 2024-09-03 PROCEDURE — 80053 COMPREHEN METABOLIC PANEL: CPT

## 2024-09-03 NOTE — ASSESSMENT & PLAN NOTE
A1c slightly above goal  Meds being adjusted per pharmacy  - amaryl started and metformin decreased  On SGLT-2, ACE  Start rosuvastatin 10 mg daily - Risks, benefits and side effects reviewed with patient.   Work on diet reviewed with patient.   Recheck 6 months

## 2024-09-03 NOTE — PROGRESS NOTES
Subjective   Patient ID: Irene Jett is a 74 y.o. female who presents for Follow-up.  HPI  Patient presents today for follow up labs and chronic conditions.  Patient feels well. No other complaints or concerns.    The patient's relevant past medical, surgical, family, and social history was reviewed in [facility].  All pertinent lab work and results for this visit were reviewed with patient.    Lab on 09/03/2024   Component Date Value Ref Range Status    LDL, Direct 09/03/2024 97  0 - 129 mg/dL Final    Cholesterol 09/03/2024 186  0 - 199 mg/dL Final          Age      Desirable   Borderline High   High     0-19 Y     0 - 169       170 - 199     >/= 200    20-24 Y     0 - 189       190 - 224     >/= 225         >24 Y     0 - 199       200 - 239     >/= 240   **All ranges are based on fasting samples. Specific   therapeutic targets will vary based on patient-specific   cardiac risk.    Pediatric guidelines reference:Pediatrics 2011, 128(S5).Adult guidelines reference: NCEP ATPIII Guidelines,BLESSING 2001, 258:2486-97    Venipuncture immediately after or during the administration of Metamizole may lead to falsely low results. Testing should be performed immediately prior to Metamizole dosing.    HDL-Cholesterol 09/03/2024 52.5  mg/dL Final      Age       Very Low   Low     Normal    High    0-19 Y    < 35      < 40     40-45     ----  20-24 Y    ----     < 40      >45      ----        >24 Y      ----     < 40     40-60      >60      Cholesterol/HDL Ratio 09/03/2024 3.5   Final      Ref Values  Desirable  < 3.4  High Risk  > 5.0    LDL Calculated 09/03/2024 92  <=99 mg/dL Final                                Near   Borderline      AGE      Desirable  Optimal    High     High     Very High     0-19 Y     0 - 109     ---    110-129   >/= 130     ----    20-24 Y     0 - 119     ---    120-159   >/= 160     ----      >24 Y     0 -  99   100-129  130-159   160-189     >/=190      VLDL 09/03/2024 42 (H)  0 - 40 mg/dL Final     Triglycerides 09/03/2024 210 (H)  0 - 149 mg/dL Final       Age         Desirable   Borderline High   High     Very High   0 D-90 D    19 - 174         ----         ----        ----  91 D- 9 Y     0 -  74        75 -  99     >/= 100      ----    10-19 Y     0 -  89        90 - 129     >/= 130      ----    20-24 Y     0 - 114       115 - 149     >/= 150      ----         >24 Y     0 - 149       150 - 199    200- 499    >/= 500    Venipuncture immediately after or during the administration of Metamizole may lead to falsely low results. Testing should be performed immediately prior to Metamizole dosing.    Non HDL Cholesterol 09/03/2024 134  0 - 149 mg/dL Final          Age       Desirable   Borderline High   High     Very High     0-19 Y     0 - 119       120 - 144     >/= 145    >/= 160    20-24 Y     0 - 149       150 - 189     >/= 190      ----         >24 Y    30 mg/dL above LDL Cholesterol goal      Glucose 09/03/2024 137 (H)  74 - 99 mg/dL Final    Sodium 09/03/2024 139  136 - 145 mmol/L Final    Potassium 09/03/2024 3.9  3.5 - 5.3 mmol/L Final    Chloride 09/03/2024 103  98 - 107 mmol/L Final    Bicarbonate 09/03/2024 28  21 - 32 mmol/L Final    Anion Gap 09/03/2024 12  10 - 20 mmol/L Final    Urea Nitrogen 09/03/2024 18  6 - 23 mg/dL Final    Creatinine 09/03/2024 0.94  0.50 - 1.05 mg/dL Final    eGFR 09/03/2024 63  >60 mL/min/1.73m*2 Final    Calculations of estimated GFR are performed using the 2021 CKD-EPI Study Refit equation without the race variable for the IDMS-Traceable creatinine methods.  https://jasn.asnjournals.org/content/early/2021/09/22/ASN.3508482075    Calcium 09/03/2024 9.1  8.6 - 10.3 mg/dL Final    Albumin 09/03/2024 4.4  3.4 - 5.0 g/dL Final    Alkaline Phosphatase 09/03/2024 58  33 - 136 U/L Final    Total Protein 09/03/2024 6.7  6.4 - 8.2 g/dL Final    AST 09/03/2024 13  9 - 39 U/L Final    Bilirubin, Total 09/03/2024 0.7  0.0 - 1.2 mg/dL Final    ALT 09/03/2024 12  7 - 45 U/L Final     "Patients treated with Sulfasalazine may generate falsely decreased results for ALT.    Albumin, Urine Random 09/03/2024 <7.0  Not established mg/L Final    Creatinine, Urine Random 09/03/2024 95.1  20.0 - 320.0 mg/dL Final    Albumin/Creatinine Ratio 09/03/2024    Final    One or more analytes used in this calculation is outside of the analytical measurement range. Calculation cannot be performed.    Hemoglobin A1C 09/03/2024 7.4 (H)  see below % Final    Estimated Average Glucose 09/03/2024 166  Not Established mg/dL Final    Thyroid Stimulating Hormone 09/03/2024 3.65  0.44 - 3.98 mIU/L Final           Review of Systems   A complete review of systems was performed and all systems were normal except what is noted in the HPI.        Objective   /82   Pulse 65   Temp 36.1 °C (97 °F)   Ht 1.778 m (5' 10\")   Wt 89.4 kg (197 lb)   SpO2 97%   BMI 28.27 kg/m²    Physical Exam  Constitutional:       Appearance: Normal appearance.   HENT:      Head: Normocephalic and atraumatic.   Neck:      Vascular: No carotid bruit.   Cardiovascular:      Rate and Rhythm: Normal rate and regular rhythm.      Heart sounds: Normal heart sounds.   Pulmonary:      Effort: Pulmonary effort is normal.      Breath sounds: Normal breath sounds. No wheezing, rhonchi or rales.   Abdominal:      General: Abdomen is flat. Bowel sounds are normal.      Palpations: Abdomen is soft.      Tenderness: There is no abdominal tenderness. There is no guarding.   Musculoskeletal:         General: Normal range of motion.      Right lower leg: No edema.      Left lower leg: No edema.   Skin:     General: Skin is dry.   Neurological:      General: No focal deficit present.      Mental Status: She is alert and oriented to person, place, and time.   Psychiatric:         Mood and Affect: Mood normal.         Behavior: Behavior normal.         Thought Content: Thought content normal.         Health Maintenance Due   Topic Date Due    DTaP/Tdap/Td " Vaccines (1 - Tdap) Never done    Zoster Vaccines (1 of 2) Never done    RSV Pregnant patients and/or  patients aged 60+ years (1 - 1-dose 60+ series) Never done    COVID-19 Vaccine (7 - 2023-24 season) 09/01/2024        Assessment/Plan   Problem List Items Addressed This Visit       Chronic kidney disease, stage 3a (Multi)     Stable  No microalbuminuria   On SGLT-2 and ACE  Low salt diet  Recheck 6 months            Relevant Orders    CBC    Comprehensive Metabolic Panel    Albumin-Creatinine Ratio, Urine Random    Hemoglobin A1C    Essential hypertension     Well controlled. Continue current medication and recheck in 6 months.           Relevant Medications    lisinopril 40 mg tablet    Other Relevant Orders    Comprehensive Metabolic Panel    Essential hypertriglyceridemia     Back up from 182 to 210  continue work on diet  Recheck 6 months          Relevant Orders    TSH with reflex to Free T4 if abnormal (Completed)    TSH with reflex to Free T4 if abnormal    Lipid Panel    Cholesterol, LDL Direct    Type 2 diabetes mellitus with hyperglycemia, without long-term current use of insulin (Multi) - Primary     A1c slightly above goal  Meds being adjusted per pharmacy  - amaryl started and metformin decreased  On SGLT-2, ACE  Start rosuvastatin 10 mg daily - Risks, benefits and side effects reviewed with patient.   Work on diet reviewed with patient.   Recheck 6 months           Relevant Medications    rosuvastatin (Crestor) 10 mg tablet    Other Relevant Orders    Comprehensive Metabolic Panel    Hemoglobin A1C    Vitamin B12    Elevated TSH    Relevant Orders    TSH with reflex to Free T4 if abnormal (Completed)    TSH with reflex to Free T4 if abnormal         Patient understands and agrees with care plan.           Reanna Payne MD

## 2024-09-03 NOTE — PATIENT INSTRUCTIONS
I recommend RSV vaccine, Shingrix, new COVID booster, and Boostrix at the pharmacy.    I would like you to follow up in 6 months  Please have all labs that were ordered done at least 1 week prior to your visit.

## 2024-09-09 ENCOUNTER — APPOINTMENT (OUTPATIENT)
Dept: PRIMARY CARE | Facility: CLINIC | Age: 74
End: 2024-09-09
Payer: MEDICARE

## 2024-09-09 VITALS
DIASTOLIC BLOOD PRESSURE: 82 MMHG | BODY MASS INDEX: 28.2 KG/M2 | OXYGEN SATURATION: 97 % | SYSTOLIC BLOOD PRESSURE: 134 MMHG | HEART RATE: 65 BPM | WEIGHT: 197 LBS | TEMPERATURE: 97 F | HEIGHT: 70 IN

## 2024-09-09 DIAGNOSIS — N18.31 CHRONIC KIDNEY DISEASE, STAGE 3A (MULTI): ICD-10-CM

## 2024-09-09 DIAGNOSIS — R79.89 ELEVATED TSH: ICD-10-CM

## 2024-09-09 DIAGNOSIS — E11.65 TYPE 2 DIABETES MELLITUS WITH HYPERGLYCEMIA, WITHOUT LONG-TERM CURRENT USE OF INSULIN (MULTI): Primary | ICD-10-CM

## 2024-09-09 DIAGNOSIS — E78.1 ESSENTIAL HYPERTRIGLYCERIDEMIA: ICD-10-CM

## 2024-09-09 DIAGNOSIS — I10 ESSENTIAL HYPERTENSION: ICD-10-CM

## 2024-09-09 PROCEDURE — 1123F ACP DISCUSS/DSCN MKR DOCD: CPT | Performed by: FAMILY MEDICINE

## 2024-09-09 PROCEDURE — G2211 COMPLEX E/M VISIT ADD ON: HCPCS | Performed by: FAMILY MEDICINE

## 2024-09-09 PROCEDURE — RXMED WILLOW AMBULATORY MEDICATION CHARGE

## 2024-09-09 PROCEDURE — 3079F DIAST BP 80-89 MM HG: CPT | Performed by: FAMILY MEDICINE

## 2024-09-09 PROCEDURE — G0008 ADMIN INFLUENZA VIRUS VAC: HCPCS | Performed by: FAMILY MEDICINE

## 2024-09-09 PROCEDURE — 99214 OFFICE O/P EST MOD 30 MIN: CPT | Performed by: FAMILY MEDICINE

## 2024-09-09 PROCEDURE — 90656 IIV3 VACC NO PRSV 0.5 ML IM: CPT | Performed by: FAMILY MEDICINE

## 2024-09-09 PROCEDURE — 3075F SYST BP GE 130 - 139MM HG: CPT | Performed by: FAMILY MEDICINE

## 2024-09-09 PROCEDURE — 1157F ADVNC CARE PLAN IN RCRD: CPT | Performed by: FAMILY MEDICINE

## 2024-09-09 PROCEDURE — 1036F TOBACCO NON-USER: CPT | Performed by: FAMILY MEDICINE

## 2024-09-09 PROCEDURE — 1159F MED LIST DOCD IN RCRD: CPT | Performed by: FAMILY MEDICINE

## 2024-09-09 PROCEDURE — 1160F RVW MEDS BY RX/DR IN RCRD: CPT | Performed by: FAMILY MEDICINE

## 2024-09-09 RX ORDER — ROSUVASTATIN CALCIUM 10 MG/1
10 TABLET, COATED ORAL DAILY
Qty: 100 TABLET | Refills: 3 | Status: SHIPPED | OUTPATIENT
Start: 2024-09-09 | End: 2025-10-14

## 2024-09-09 RX ORDER — LISINOPRIL 40 MG/1
40 TABLET ORAL DAILY
Qty: 90 TABLET | Refills: 3 | Status: SHIPPED | OUTPATIENT
Start: 2024-09-09 | End: 2025-09-09

## 2024-09-11 ENCOUNTER — PHARMACY VISIT (OUTPATIENT)
Dept: PHARMACY | Facility: CLINIC | Age: 74
End: 2024-09-11
Payer: MEDICARE

## 2024-09-11 DIAGNOSIS — E11.65 TYPE 2 DIABETES MELLITUS WITH HYPERGLYCEMIA, WITHOUT LONG-TERM CURRENT USE OF INSULIN (MULTI): ICD-10-CM

## 2024-09-12 ENCOUNTER — PHARMACY VISIT (OUTPATIENT)
Dept: PHARMACY | Facility: CLINIC | Age: 74
End: 2024-09-12

## 2024-09-12 PROCEDURE — RXMED WILLOW AMBULATORY MEDICATION CHARGE

## 2024-09-13 ENCOUNTER — PHARMACY VISIT (OUTPATIENT)
Dept: PHARMACY | Facility: CLINIC | Age: 74
End: 2024-09-13
Payer: MEDICARE

## 2024-09-16 ENCOUNTER — APPOINTMENT (OUTPATIENT)
Dept: PHARMACY | Facility: HOSPITAL | Age: 74
End: 2024-09-16
Payer: MEDICARE

## 2024-09-16 DIAGNOSIS — E11.65 TYPE 2 DIABETES MELLITUS WITH HYPERGLYCEMIA, WITHOUT LONG-TERM CURRENT USE OF INSULIN: ICD-10-CM

## 2024-09-16 RX ORDER — GLIMEPIRIDE 1 MG/1
1 TABLET ORAL
Qty: 90 TABLET | Refills: 0 | Status: SHIPPED | OUTPATIENT
Start: 2024-09-16 | End: 2024-12-15

## 2024-09-16 NOTE — PROGRESS NOTES
Subjective   Patient ID: Irene Jett is a 74 y.o. female who presents for Med Management.    Referring Provider: Reanna Payne MD   PCP Follow Up: 3/10/2025    Diabetes  She presents for her follow-up diabetic visit. She has type 2 diabetes mellitus. Her disease course has been improving. There are no hypoglycemic associated symptoms. Current diabetic treatment includes oral agent (dual therapy). An ACE inhibitor/angiotensin II receptor blocker is being taken.       Current  Pharmacotherapy:   Jardiance 10 mg daily  Metformin 500 mg BID  Glimepiride 1 mg QAM     Historical Pharmacotherapy:  Ozempic (stopped due to constipation, GERD)  Invokana (stopped due to yeast infection, did have good glucose benefits and weight loss benefits for her though)  Sitagliptin (stopped due to swelling)    SECONDARY PREVENTION  - Statin? Rosuvastatin 10 mg   LDL: 92   TC: 186  - ACE-I/ARB? Lisinopril 40 mg    UACR: n/a (9/3/2024)   BP: 134/82  - Aspirin? No      -The 10-year ASCVD risk score (Do CASTRO, et al., 2019) is: 42.8%    Values used to calculate the score:      Age: 74 years      Sex: Female      Is Non- : No      Diabetic: Yes      Tobacco smoker: No      Systolic Blood Pressure: 134 mmHg      Is BP treated: Yes      HDL Cholesterol: 52.5 mg/dL      Total Cholesterol: 186 mg/dL      Current monitoring regimen:   Patient is using: finger sticks, once daily     SMBG Fasting Readings: 110-120's regularly        Hypertension  Current Pharmacotherapy:   Lisinopril 40 mg daily   Hydrochlorothiazide 12.5 mg daily     Notable Historical Therapy: n/a      Current Monitoring:   Last Office BP Readin/82    BP Cuff at Home: yes  Testing BP at Home: yes    Home BP Readings: 120/70 on the daily    Home Blood Pressure Monitoring Education Provided: yes (3/18/24)     CKD  eGFR- 63 (9/3/2024)  UACR- n/a (2024)    ACE/ARB: lisinopril 40 mg daily  SGLT-2: Jardiance 10 mg daily     Review of  Systems    Medication System Management:  Affordability/Accessibility:   -concerns for when she hits the donut hole. We discuss options for PAP at that time if needed.   Adherence/Organization: n/a  Adverse Effects: n/a  -concerned for yeast infection on the Jardiance due to hx with invokana. Discuss signs and symptoms and what to do if these occur.     Objective     There were no vitals taken for this visit.     Labs  Lab Results   Component Value Date    BILITOT 0.7 09/03/2024    CALCIUM 9.1 09/03/2024    CO2 28 09/03/2024     09/03/2024    CREATININE 0.94 09/03/2024    GLUCOSE 137 (H) 09/03/2024    ALKPHOS 58 09/03/2024    K 3.9 09/03/2024    PROT 6.7 09/03/2024     09/03/2024    AST 13 09/03/2024    ALT 12 09/03/2024    BUN 18 09/03/2024    ANIONGAP 12 09/03/2024    ALBUMIN 4.4 09/03/2024    GFRF 63 08/21/2023     Lab Results   Component Value Date    TRIG 210 (H) 09/03/2024    CHOL 186 09/03/2024    LDLCALC 92 09/03/2024    HDL 52.5 09/03/2024     Lab Results   Component Value Date    HGBA1C 7.4 (H) 09/03/2024       Current Outpatient Medications on File Prior to Visit   Medication Sig Dispense Refill    blood sugar diagnostic (Blood Glucose Test) strip Use to test blood sugars once daily in the morning before breakfast. 50 each 1    blood-glucose meter misc Use to test sugars once daily 1 each 0    cyanocobalamin, vitamin B-12, 200 mcg/spray spray,suspension Place 1 spray under the tongue once daily.      empagliflozin (Jardiance) 10 mg Take 1 tablet (10 mg) by mouth once daily. 30 tablet 2    famotidine (Pepcid) 20 mg tablet Take 1 tablet (20 mg) by mouth 2 times a day as needed for indigestion or heartburn.      hydroCHLOROthiazide (Microzide) 12.5 mg tablet Take 1 tablet (12.5 mg) by mouth once daily. 30 tablet 5    lancets 33 gauge misc Use to test blood sugars once daily 100 each 1    lisinopril 40 mg tablet Take 1 tablet (40 mg) by mouth once daily. 90 tablet 3    metFORMIN (Glucophage) 500  mg tablet Take 1 tablet (500 mg) by mouth once daily. 180 tablet 1    rosuvastatin (Crestor) 10 mg tablet Take 1 tablet (10 mg) by mouth once daily. 100 tablet 3    [DISCONTINUED] empagliflozin (Jardiance) 10 mg Take 1 tablet (10 mg) by mouth once daily. 30 tablet 1    [DISCONTINUED] glimepiride (Amaryl) 1 mg tablet Take 1 tablet (1 mg) by mouth once daily in the morning. Take before meals. 30 tablet 1     No current facility-administered medications on file prior to visit.        Assessment/Plan   Problem List Items Addressed This Visit       Type 2 diabetes mellitus with hyperglycemia, without long-term current use of insulin (Multi)     Patient's diabetes is uncontrolled with an A1c of 7.4%(goal <7%).    Patient's sugars have improved signficantly since starting glimepiride. Sugars now consistently <120 daily. We will continue to monitor.     Patient was started on rosuvastatin at PCP visit. She is not taking. She would like to work on diet and lifestyle modification before starting due to concerns for side effects. I give her diet recommendations while also making sure she is aware of the risks of elevated TG levels.       Plan:  CONTINUE glimepiride, metformin and Jardiance as prescribed.          Relevant Medications    glimepiride (Amaryl) 1 mg tablet    Other Relevant Orders    Follow Up In Clinical Pharmacy       Pharmacy Follow Up: 12/16 @ 3:30 PM       Rosanne Rachel PharmD    Continue all meds under the continuation of care with the referring provider and clinical pharmacy team.

## 2024-09-16 NOTE — ASSESSMENT & PLAN NOTE
Patient's diabetes is uncontrolled with an A1c of 7.4%(goal <7%).    Patient's sugars have improved signficantly since starting glimepiride. Sugars now consistently <120 daily. We will continue to monitor.     Patient was started on rosuvastatin at PCP visit. She is not taking. She would like to work on diet and lifestyle modification before starting due to concerns for side effects. I give her diet recommendations while also making sure she is aware of the risks of elevated TG levels.       Plan:  CONTINUE glimepiride, metformin and Jardiance as prescribed.

## 2024-10-08 PROCEDURE — RXMED WILLOW AMBULATORY MEDICATION CHARGE

## 2024-10-10 ENCOUNTER — PHARMACY VISIT (OUTPATIENT)
Dept: PHARMACY | Facility: CLINIC | Age: 74
End: 2024-10-10
Payer: MEDICARE

## 2024-10-14 NOTE — PROGRESS NOTES
PRIMARY CARE PHYSICIAN: Reanna Payne MD  REFERRING PROVIDER: No referring provider defined for this encounter.     CONSULT ORTHOPAEDIC: Knee Evaluation        ASSESSMENT & PLAN    IMPRESSION:   1.  Primary arthritis, right knee  2.  History of left total knee arthroplasty    PLAN:   Had a lengthy discussion with the patient regarding her current condition.  She does have moderate to severe arthritis of right knee and is overall slowly improving but has small flareups.  She has been taking over-the-counter medications with some relief and we did offer the option of trying a corticosteroid junction but she would like to think about her options the next 1 to 2 weeks to see if she continues to notice generalized improvement.  Regarding her left knee she is functioning well can go up and down stairs but her only complaint is that direct kneeling on her left knee causes her pain.  She is able to kneel onto a soft cushion but has not tried to gradually transition from a soft cushion to thinner pads in her regular floor.  I did discuss that this can be a common complaint after knee replacement but given that she is functioning well would not change any recurrent intervention or plan for her left knee at this time.      SUBJECTIVE  CHIEF COMPLAINT:   Chief Complaint   Patient presents with    Right Knee - Pain    Left Knee - Post-op, Pain        HPI: Irene Jett is a 74 y.o. patient. Irene Jett has had progressive problems with their right knee over the past 3 weeks. They do report any trauma. She was dong at home exercises when she was getting down to the floor and was in severe pain. They do not report any constant or progressive numbness or tingling in their legs. Their symptoms are interfering with activities which include pain going up and down the leg, some episodes of instability, and popping and cracking. XR done today.    She has complains about her left knee. She had a L TKA done 7/14/2022. She states  her left knee was not fully recovered and the pain has not gotten better since the last visit.  She states she is able to walk well and go up and on stairs but has issues with kneeling and putting direct contact onto her knee with hard floors but is able to kneel on her mattress with no issues.    FUNCTIONAL STATUS: not limited.  AMBULATORY STATUS:  independent  PREVIOUS TREATMENTS: Therapy At home PT through insurance with no improvement, walks her dogs but no regular exercise program. Was riding the bike last summer with no issues.   HISTORY OF SURGERY ON AFFECTED KNEE(S): No       REVIEW OF SYSTEMS  Constitutional: See HPI for pain assessment, No significant weight loss, recent trauma  Cardiovascular: No chest pain, shortness of breath  Respiratory: No difficulty breathing, cough  Gastrointestinal: No nausea, vomiting, diarrhea, constipation  Musculoskeletal: Noted in HPI, positive for pain, restricted motion, stiffness  Integumentary: No rashes, easy bruising, redness   Neurological: no numbness or tingling in extremities, no gait disturbances   Psychiatric: No mood changes, memory changes, social issues  Heme/Lymph: no excessive swelling, easy bruising, excessive bleeding  ENT: No hearing changes  Eyes: No vision changes    Past Medical History:   Diagnosis Date    Allergic     Arthritis     Body mass index (BMI) 29.0-29.9, adult 05/07/2021    Body mass index (BMI) of 29.0 to 29.9 in adult    Cataract     Diabetes mellitus (Multi)     GERD (gastroesophageal reflux disease)     Headache     Hypertension     Obstructive sleep apnea (adult) (pediatric) 05/24/2022    LUCY on CPAP    Obstructive sleep apnea (adult) (pediatric) 05/24/2022    LUCY on CPAP    Obstructive sleep apnea (adult) (pediatric) 05/24/2022    LUCY on CPAP    Obstructive sleep apnea (adult) (pediatric) 05/24/2022    LUCY on CPAP    LUCY on CPAP 04/25/2023    Other specified abnormal findings of blood chemistry 10/22/2021    Elevated TSH    Other  specified anxiety disorders     Situational anxiety    Personal history of other specified conditions 2014    History of abdominal pain    Status post left knee replacement 2023        Allergies   Allergen Reactions    Ozempic [Semaglutide] Constipation    Sitagliptin Swelling and Other    Latex Rash and Unknown        Past Surgical History:   Procedure Laterality Date    APPENDECTOMY      HYSTERECTOMY      JOINT REPLACEMENT      OTHER SURGICAL HISTORY  10/22/2019    Ovarian cystectomy    OTHER SURGICAL HISTORY  10/22/2019    Colonic polypectomy    OTHER SURGICAL HISTORY  10/22/2019    Hysterectomy    OTHER SURGICAL HISTORY  10/22/2019    Foot surgery    OTHER SURGICAL HISTORY  10/22/2019    Appendectomy    TOTAL KNEE ARTHROPLASTY Left 2022        Family History   Problem Relation Name Age of Onset    Diabetes Mother Camelia     Hypertension Mother Camelia     Arthritis Mother Camelia     Hypertension Father Jeana     Stroke Maternal Grandmother Stephanie     Diabetes Maternal Grandfather Alfonso     Colon cancer Maternal Grandfather Alfonso     Breast cancer Mother's Sister      Cancer Daughter Dimple         Social History     Socioeconomic History    Marital status:      Spouse name: Not on file    Number of children: Not on file    Years of education: Not on file    Highest education level: Not on file   Occupational History    Not on file   Tobacco Use    Smoking status: Former     Current packs/day: 0.00     Average packs/day: 0.7 packs/day for 8.0 years (5.6 ttl pk-yrs)     Types: Cigarettes     Start date:      Quit date:      Years since quittin.8    Smokeless tobacco: Never   Vaping Use    Vaping status: Never Used   Substance and Sexual Activity    Alcohol use: Never    Drug use: Never    Sexual activity: Not Currently     Partners: Male     Birth control/protection: Condom Male   Other Topics Concern    Not on file   Social History Narrative    Not on file     Social  Determinants of Health     Financial Resource Strain: Not on file   Food Insecurity: Not on file   Transportation Needs: Not on file   Physical Activity: Not on file   Stress: Not on file   Social Connections: Not on file   Intimate Partner Violence: Not on file   Housing Stability: Not on file        CURRENT MEDICATIONS:   Current Outpatient Medications   Medication Sig Dispense Refill    blood sugar diagnostic (Blood Glucose Test) strip Use to test blood sugars once daily in the morning before breakfast. 50 each 1    blood-glucose meter misc Use to test sugars once daily 1 each 0    cyanocobalamin, vitamin B-12, 200 mcg/spray spray,suspension Place 1 spray under the tongue once daily.      empagliflozin (Jardiance) 10 mg Take 1 tablet (10 mg) by mouth once daily. 30 tablet 2    famotidine (Pepcid) 20 mg tablet Take 1 tablet (20 mg) by mouth 2 times a day as needed for indigestion or heartburn.      glimepiride (Amaryl) 1 mg tablet Take 1 tablet (1 mg) by mouth once daily in the morning. Take before meals. 90 tablet 0    hydroCHLOROthiazide (Microzide) 12.5 mg tablet Take 1 tablet (12.5 mg) by mouth once daily. 30 tablet 5    lancets 33 gauge misc Use to test blood sugars once daily 100 each 1    lisinopril 40 mg tablet Take 1 tablet (40 mg) by mouth once daily. 90 tablet 3    metFORMIN (Glucophage) 500 mg tablet Take 1 tablet (500 mg) by mouth once daily. 180 tablet 1    rosuvastatin (Crestor) 10 mg tablet Take 1 tablet (10 mg) by mouth once daily. (Patient not taking: Reported on 9/16/2024) 100 tablet 3     No current facility-administered medications for this visit.        OBJECTIVE    PHYSICAL EXAM      8/28/2023    10:39 AM 8/28/2023    10:53 AM 3/6/2024    11:10 AM 6/10/2024    10:37 AM 6/10/2024    10:58 AM 9/9/2024    10:29 AM 10/15/2024    12:58 PM   Vitals   Systolic 154 138 137 143 120 134    Diastolic 82 88 85 85 74 82    Heart Rate 72  66 66  65    Temp 36.1 °C (97 °F)  36.2 °C (97.1 °F) 36.1 °C (97  "°F)  36.1 °C (97 °F)    Height (in) 1.778 m (5' 10\")  1.778 m (5' 10\") 1.778 m (5' 10\")  1.778 m (5' 10\") 1.778 m (5' 10\")   Weight (lb) 206  209.8 200  197 197   BMI 29.56 kg/m2  30.1 kg/m2 28.7 kg/m2  28.27 kg/m2 28.27 kg/m2   BSA (m2) 2.15 m2  2.17 m2 2.12 m2  2.1 m2 2.1 m2   Visit Report Report Report Report Report Report Report Report      Body mass index is 28.27 kg/m².    GENERAL: A/Ox3, NAD. Appears healthy, well nourished  PSYCHIATRIC: Mood stable, appropriate memory recall  EYES: EOM intact, no scleral icterus  CARDIAC: regular rate  LUNGS: Breathing non-labored  SKIN: no erythema, rashes, or ecchymoses     MUSCULOSKELETAL:  Laterality: bilateral Knee Exam  -Midline incision of the left knee is well-healed  - Alignment: Partially correctable varus deformity right knee, neutral left knee  - ROM: 5 to 115 degrees right knee, 0 to 100 degrees left knee  - Effusion: 0 bilateral  - Strength: knee extension and flexion 5/5, EHL/PF/DF motor intact.  No pain with resisted knee extension or flexion  - Palpation: Tender to palpation on bilateral medial lateral joint line and anterior aspect of the patella left knee  - Stability: Anterior/Posterior stable, varus/valgus stable  - Gait: normal  - Hip Exam: flexion to 100+ degrees, full extension, internal/external rotation adequate, and no pain with log roll    NEUROVASCULAR:  - Neurovascular Status: sensation intact to light touch distally  - Capillary refill brisk at extremities, Bilateral dorsalis pedis pulse 2+     IMAGING:  Multiple views of the affected bilateral knee(s) demonstrate: No signs of loosening or failure of left total knee arthroplasty.  Right knee with severe tricompartmental osteoarthritic changes noted most notably medial compartment joint space narrowing, subchondral sclerosis, marginal osteophytic change and varus deformity.   X-rays were personally reviewed and interpreted by me.  Radiology reports were reviewed by me as well, if readily " available at the time.        Jenny Rivera DO  Attending Surgeon  Joint Replacement and Adult Reconstructive Surgery  [place], [place]

## 2024-10-15 ENCOUNTER — APPOINTMENT (OUTPATIENT)
Dept: ORTHOPEDIC SURGERY | Facility: CLINIC | Age: 74
End: 2024-10-15
Payer: MEDICARE

## 2024-10-15 ENCOUNTER — HOSPITAL ENCOUNTER (OUTPATIENT)
Dept: RADIOLOGY | Facility: CLINIC | Age: 74
Discharge: HOME | End: 2024-10-15
Payer: MEDICARE

## 2024-10-15 VITALS — WEIGHT: 197 LBS | BODY MASS INDEX: 28.2 KG/M2 | HEIGHT: 70 IN

## 2024-10-15 DIAGNOSIS — M25.561 RIGHT KNEE PAIN, UNSPECIFIED CHRONICITY: ICD-10-CM

## 2024-10-15 DIAGNOSIS — M17.11 PRIMARY OSTEOARTHRITIS OF RIGHT KNEE: ICD-10-CM

## 2024-10-15 DIAGNOSIS — Z96.652 STATUS POST LEFT KNEE REPLACEMENT: Primary | ICD-10-CM

## 2024-10-15 PROCEDURE — 99214 OFFICE O/P EST MOD 30 MIN: CPT | Performed by: ORTHOPAEDIC SURGERY

## 2024-10-15 PROCEDURE — 1125F AMNT PAIN NOTED PAIN PRSNT: CPT | Performed by: ORTHOPAEDIC SURGERY

## 2024-10-15 PROCEDURE — 1157F ADVNC CARE PLAN IN RCRD: CPT | Performed by: ORTHOPAEDIC SURGERY

## 2024-10-15 PROCEDURE — 1159F MED LIST DOCD IN RCRD: CPT | Performed by: ORTHOPAEDIC SURGERY

## 2024-10-15 PROCEDURE — 73564 X-RAY EXAM KNEE 4 OR MORE: CPT | Mod: RIGHT SIDE | Performed by: RADIOLOGY

## 2024-10-15 PROCEDURE — 73564 X-RAY EXAM KNEE 4 OR MORE: CPT | Mod: RT

## 2024-10-15 PROCEDURE — 1036F TOBACCO NON-USER: CPT | Performed by: ORTHOPAEDIC SURGERY

## 2024-10-15 PROCEDURE — 1123F ACP DISCUSS/DSCN MKR DOCD: CPT | Performed by: ORTHOPAEDIC SURGERY

## 2024-10-15 ASSESSMENT — PAIN - FUNCTIONAL ASSESSMENT: PAIN_FUNCTIONAL_ASSESSMENT: 0-10

## 2024-10-15 ASSESSMENT — PAIN SCALES - GENERAL: PAINLEVEL_OUTOF10: 3

## 2024-10-18 ENCOUNTER — TELEPHONE (OUTPATIENT)
Dept: PRIMARY CARE | Facility: CLINIC | Age: 74
End: 2024-10-18
Payer: MEDICARE

## 2024-10-18 DIAGNOSIS — I10 ESSENTIAL HYPERTENSION: ICD-10-CM

## 2024-10-18 PROCEDURE — RXMED WILLOW AMBULATORY MEDICATION CHARGE

## 2024-10-18 RX ORDER — HYDROCHLOROTHIAZIDE 12.5 MG/1
12.5 TABLET ORAL DAILY
Qty: 90 TABLET | Refills: 3 | Status: SHIPPED | OUTPATIENT
Start: 2024-10-18

## 2024-10-18 NOTE — TELEPHONE ENCOUNTER
Rx Refill Request Telephone Encounter    Name:  Irene Jett  :  [id number]  Medication Name:    hydroCHLOROthiazide (Microzide) 12.5 mg tablet   Route: Take 1 tablet (12.5 mg) by mouth once daily.        Specific Pharmacy location:  [facility]   Date of last appointment:  24  Date of next appointment:  3/10/25  Best number to reach patient: [phone]

## 2024-10-21 ENCOUNTER — PHARMACY VISIT (OUTPATIENT)
Dept: PHARMACY | Facility: CLINIC | Age: 74
End: 2024-10-21
Payer: MEDICARE

## 2024-11-06 PROCEDURE — RXMED WILLOW AMBULATORY MEDICATION CHARGE

## 2024-11-07 ENCOUNTER — PHARMACY VISIT (OUTPATIENT)
Dept: PHARMACY | Facility: CLINIC | Age: 74
End: 2024-11-07
Payer: MEDICARE

## 2024-12-03 PROCEDURE — RXMED WILLOW AMBULATORY MEDICATION CHARGE

## 2024-12-05 ENCOUNTER — PHARMACY VISIT (OUTPATIENT)
Dept: PHARMACY | Facility: CLINIC | Age: 74
End: 2024-12-05
Payer: MEDICARE

## 2024-12-05 DIAGNOSIS — E11.65 TYPE 2 DIABETES MELLITUS WITH HYPERGLYCEMIA, WITHOUT LONG-TERM CURRENT USE OF INSULIN: ICD-10-CM

## 2024-12-05 PROCEDURE — RXMED WILLOW AMBULATORY MEDICATION CHARGE

## 2024-12-13 NOTE — PROGRESS NOTES
Clinical Pharmacy Appointment    Patient ID: Irene Jett is a 74 y.o. female who presents for Diabetes.    Pt is here for Follow Up     Referring Provider: Reanna Chang MD  PCP: Reanna Payne MD   Last visit with PCP: 9/9/2024   Next visit with PCP: 3/10/2025      Subjective     Interval History  None    HPI  Type II Diabetes  Current  Pharmacotherapy:   Jardiance 10 mg daily  Glimepiride 1 mg daily  Metformin 500 mg daily     SECONDARY PREVENTION  - Statin? Rosuvastatin 10 mg   LDL: 97   TC: 186  - ACE-I/ARB? Lisinopril 40 mg   UACR: 0 (9/2024)   BP: 134/82  - Aspirin? No    -The 10-year ASCVD risk score (Do CASTRO, et al., 2019) is: 42.8%    Values used to calculate the score:      Age: 74 years      Sex: Female      Is Non- : No      Diabetic: Yes      Tobacco smoker: No      Systolic Blood Pressure: 134 mmHg      Is BP treated: Yes      HDL Cholesterol: 52.5 mg/dL      Total Cholesterol: 186 mg/dL      Current monitoring regimen:   Patient is using: finger sticks     SMBG Fasting Readings: 108 today  110-120's most mornings      Objective   Allergies   Allergen Reactions    Ozempic [Semaglutide] Constipation    Sitagliptin Swelling and Other    Latex Rash and Unknown     Social History     Social History Narrative    Not on file      Medication Review  Current Outpatient Medications   Medication Instructions    blood sugar diagnostic (Blood Glucose Test) strip Use to test blood sugars once daily in the morning before breakfast.    blood-glucose meter misc Use to test sugars once daily    cyanocobalamin, vitamin B-12, 200 mcg/spray spray,suspension 1 spray, sublingual, Daily    famotidine (Pepcid) 20 mg tablet 1 tablet, oral, 2 times daily PRN    glimepiride (AMARYL) 1 mg, oral, Daily before breakfast    hydroCHLOROthiazide (MICROZIDE) 12.5 mg, oral, Daily    Jardiance 10 mg, oral, Daily    lancets 33 gauge misc Use to test blood sugars once daily    lisinopril 40 mg,  oral, Daily    metFORMIN (GLUCOPHAGE) 500 mg, oral, Daily      Vitals  BP Readings from Last 2 Encounters:   09/09/24 134/82   06/10/24 120/74     BMI Readings from Last 1 Encounters:   10/15/24 28.27 kg/m²      Labs  A1C  Lab Results   Component Value Date    HGBA1C 7.4 (H) 09/03/2024    HGBA1C 7.6 (H) 06/07/2024    HGBA1C 7.1 02/23/2024     BMP  Lab Results   Component Value Date    CALCIUM 9.1 09/03/2024     09/03/2024    K 3.9 09/03/2024    CO2 28 09/03/2024     09/03/2024    BUN 18 09/03/2024    CREATININE 0.94 09/03/2024    EGFR 63 09/03/2024     LFTs  Lab Results   Component Value Date    ALT 12 09/03/2024    AST 13 09/03/2024    ALKPHOS 58 09/03/2024    BILITOT 0.7 09/03/2024     FLP  Lab Results   Component Value Date    TRIG 210 (H) 09/03/2024    CHOL 186 09/03/2024    LDLF 78 08/21/2023    LDLCALC 92 09/03/2024    HDL 52.5 09/03/2024     Urine Microalbumin  Lab Results   Component Value Date    MICROALBCREA  09/03/2024      Comment:      One or more analytes used in this calculation is outside of the analytical measurement range. Calculation cannot be performed.     Weight Management  Wt Readings from Last 3 Encounters:   10/15/24 89.4 kg (197 lb)   09/09/24 89.4 kg (197 lb)   06/10/24 90.7 kg (200 lb)      There is no height or weight on file to calculate BMI.     Assessment/Plan   Problem List Items Addressed This Visit       Type 2 diabetes mellitus with hyperglycemia, without long-term current use of insulin     Patient's diabetes is uncontrolled with an A1c of 7.4%(goal <7%).    At 3-mo follow up, patient continues to do well. Sugars are consistently well controlled. We discuss that we will update her A1c at next PCP visit. Pending those results we can discuss discontinuation of metformin, as she feels as though it isn't as effective.     Plan:  CONTINUE glimepiride, metformin and Jardiance as prescribed.          Relevant Orders    Referral to Clinical Pharmacy     Clinical Pharmacist  follow-up: 3/17 @ 3:20 PM, Telehealth visit    Continue all meds under the continuation of care with the referring provider and clinical pharmacy team.    Thank you,  Rosanne Rachel, PharmD  Clinical Pharmacy Specialist     Verbal consent to manage patient's drug therapy was obtained from the patient. They were informed they may decline to participate or withdraw from participation in pharmacy services at any time.

## 2024-12-16 ENCOUNTER — APPOINTMENT (OUTPATIENT)
Dept: PHARMACY | Facility: HOSPITAL | Age: 74
End: 2024-12-16
Payer: MEDICARE

## 2024-12-16 DIAGNOSIS — E11.65 TYPE 2 DIABETES MELLITUS WITH HYPERGLYCEMIA, WITHOUT LONG-TERM CURRENT USE OF INSULIN: ICD-10-CM

## 2024-12-16 NOTE — ASSESSMENT & PLAN NOTE
Patient's diabetes is uncontrolled with an A1c of 7.4%(goal <7%).    At 3-mo follow up, patient continues to do well. Sugars are consistently well controlled. We discuss that we will update her A1c at next PCP visit. Pending those results we can discuss discontinuation of metformin, as she feels as though it isn't as effective.     Plan:  CONTINUE glimepiride, metformin and Jardiance as prescribed.

## 2024-12-27 DIAGNOSIS — E11.65 TYPE 2 DIABETES MELLITUS WITH HYPERGLYCEMIA, WITHOUT LONG-TERM CURRENT USE OF INSULIN: ICD-10-CM

## 2024-12-27 PROCEDURE — RXMED WILLOW AMBULATORY MEDICATION CHARGE

## 2024-12-27 RX ORDER — GLIMEPIRIDE 1 MG/1
1 TABLET ORAL
Qty: 90 TABLET | Refills: 0 | Status: SHIPPED | OUTPATIENT
Start: 2024-12-27 | End: 2025-03-27

## 2024-12-27 RX ORDER — BLOOD SUGAR DIAGNOSTIC
STRIP MISCELLANEOUS
Qty: 50 EACH | Refills: 1 | Status: SHIPPED | OUTPATIENT
Start: 2024-12-27

## 2025-01-02 ENCOUNTER — PHARMACY VISIT (OUTPATIENT)
Dept: PHARMACY | Facility: CLINIC | Age: 75
End: 2025-01-02
Payer: MEDICARE

## 2025-01-21 PROCEDURE — RXMED WILLOW AMBULATORY MEDICATION CHARGE

## 2025-01-23 ENCOUNTER — PHARMACY VISIT (OUTPATIENT)
Dept: PHARMACY | Facility: CLINIC | Age: 75
End: 2025-01-23
Payer: MEDICARE

## 2025-02-28 ENCOUNTER — PHARMACY VISIT (OUTPATIENT)
Dept: PHARMACY | Facility: CLINIC | Age: 75
End: 2025-02-28
Payer: MEDICARE

## 2025-02-28 PROCEDURE — RXMED WILLOW AMBULATORY MEDICATION CHARGE

## 2025-02-28 RX ORDER — PREDNISOLONE ACETATE 10 MG/ML
SUSPENSION/ DROPS OPHTHALMIC
Qty: 5 ML | Refills: 0 | OUTPATIENT
Start: 2025-02-28

## 2025-03-10 ENCOUNTER — APPOINTMENT (OUTPATIENT)
Dept: PRIMARY CARE | Facility: CLINIC | Age: 75
End: 2025-03-10
Payer: MEDICARE

## 2025-03-10 PROCEDURE — RXMED WILLOW AMBULATORY MEDICATION CHARGE

## 2025-03-13 ENCOUNTER — PHARMACY VISIT (OUTPATIENT)
Dept: PHARMACY | Facility: CLINIC | Age: 75
End: 2025-03-13
Payer: MEDICARE

## 2025-03-14 NOTE — PROGRESS NOTES
Clinical Pharmacy Appointment    Patient ID: Irene Jett is a 74 y.o. female who presents for No chief complaint on file..    Pt is here for Follow Up     Referring Provider: Reanna Chang MD  PCP: Reanna Payne MD   Last visit with PCP: 3/10/25  Next visit with PCP: 8/15/25      Subjective     Interval History  Last visit medications were kept the same d/t controlled blood sugars     HPI  Type II Diabetes  Current  Pharmacotherapy:   Jardiance 10 mg daily  Glimepiride 1 mg with breakfast  Metformin 500 mg with lunch     SECONDARY PREVENTION  - Statin? Rosuvastatin 10 mg   LDL: 97   TC: 186  - ACE-I/ARB? Lisinopril 40 mg   UACR: 0 (9/2024)   BP: 134/82  - Aspirin? No    -The 10-year ASCVD risk score (Do CASTRO, et al., 2019) is: 42.8%    Values used to calculate the score:      Age: 74 years      Sex: Female      Is Non- : No      Diabetic: Yes      Tobacco smoker: No      Systolic Blood Pressure: 134 mmHg      Is BP treated: Yes      HDL Cholesterol: 52.5 mg/dL      Total Cholesterol: 186 mg/dL      Current monitoring regimen:   Patient is using: finger sticks     SMBG Fasting Readings:   - low 100s, some in 130's  - 160 1 or 2 times   - She is unsure what caused this; thinks possibly from putting lotion on then checking BGs    Hypoglycemia? Sometimes feel lightheaded in morning but once she eats she feels better    Adverse Reactions: None; Not as many yeast infections as on Jardiance 25 mg (some external)      Objective   Allergies   Allergen Reactions    Ozempic [Semaglutide] Constipation    Sitagliptin Swelling and Other    Latex Rash and Unknown     Social History     Social History Narrative    Not on file      Medication Review  Current Outpatient Medications   Medication Instructions    blood sugar diagnostic (OneTouch Ultra Test) strip Use to test blood sugars once daily in the morning before breakfast.    blood-glucose meter misc Use to test sugars once daily     cyanocobalamin, vitamin B-12, 200 mcg/spray spray,suspension 1 spray, sublingual, Daily    famotidine (Pepcid) 20 mg tablet 1 tablet, oral, 2 times daily PRN    glimepiride (AMARYL) 1 mg, oral, Daily before breakfast    hydroCHLOROthiazide (MICROZIDE) 12.5 mg, oral, Daily    Jardiance 10 mg, oral, Daily    lancets 33 gauge misc Use to test blood sugars once daily    lisinopril 40 mg, oral, Daily    metFORMIN (GLUCOPHAGE) 500 mg, oral, Daily    prednisoLONE acetate (Pred Forte) 1 % ophthalmic suspension Location: Left Eye. Instill 1 drop into the LEFT EYE ONLY 4 times a day for 5 days      Vitals  BP Readings from Last 2 Encounters:   09/09/24 134/82   06/10/24 120/74     BMI Readings from Last 1 Encounters:   10/15/24 28.27 kg/m²      Labs  A1C  Lab Results   Component Value Date    HGBA1C 7.4 (H) 09/03/2024    HGBA1C 7.6 (H) 06/07/2024    HGBA1C 7.1 02/23/2024     BMP  Lab Results   Component Value Date    CALCIUM 9.1 09/03/2024     09/03/2024    K 3.9 09/03/2024    CO2 28 09/03/2024     09/03/2024    BUN 18 09/03/2024    CREATININE 0.94 09/03/2024    EGFR 63 09/03/2024     LFTs  Lab Results   Component Value Date    ALT 12 09/03/2024    AST 13 09/03/2024    ALKPHOS 58 09/03/2024    BILITOT 0.7 09/03/2024     FLP  Lab Results   Component Value Date    TRIG 210 (H) 09/03/2024    CHOL 186 09/03/2024    LDLF 78 08/21/2023    LDLCALC 92 09/03/2024    HDL 52.5 09/03/2024     Urine Microalbumin  Lab Results   Component Value Date    MICROALBCREA  09/03/2024      Comment:      One or more analytes used in this calculation is outside of the analytical measurement range. Calculation cannot be performed.     Weight Management  Wt Readings from Last 3 Encounters:   10/15/24 89.4 kg (197 lb)   09/09/24 89.4 kg (197 lb)   06/10/24 90.7 kg (200 lb)      There is no height or weight on file to calculate BMI.     Assessment/Plan   Problem List Items Addressed This Visit       Type 2 diabetes mellitus with  hyperglycemia, without long-term current use of insulin    Relevant Orders    Referral to Clinical Pharmacy     A1c is due. Plans to get done here within the week. Blood sugars are doing well. Reports some itching outside of vaginal area but tolerable. Denies any hypoglycemia. Will keep current regimen the same and follow up in 3 months/after completion of A1c    Clinical Pharmacist follow-up:  6/9/25 @ 3, Telehealth visit    Continue all meds under the continuation of care with the referring provider and clinical pharmacy team.    Thank you,  Patricia Crump, PharmD  Clinical Pharmacy Specialist - Primary Care  [phone]  [email or web address]    Verbal consent to manage patient's drug therapy was obtained from the patient. They were informed they may decline to participate or withdraw from participation in pharmacy services at any time.

## 2025-03-17 ENCOUNTER — APPOINTMENT (OUTPATIENT)
Dept: PHARMACY | Facility: HOSPITAL | Age: 75
End: 2025-03-17
Payer: MEDICARE

## 2025-03-17 DIAGNOSIS — E11.65 TYPE 2 DIABETES MELLITUS WITH HYPERGLYCEMIA, WITHOUT LONG-TERM CURRENT USE OF INSULIN: ICD-10-CM

## 2025-03-19 LAB
ALBUMIN SERPL-MCNC: 4.7 G/DL (ref 3.6–5.1)
ALBUMIN/CREAT UR: NORMAL MG/G CREAT
ALP SERPL-CCNC: 60 U/L (ref 37–153)
ALT SERPL-CCNC: 14 U/L (ref 6–29)
ANION GAP SERPL CALCULATED.4IONS-SCNC: 9 MMOL/L (CALC) (ref 7–17)
AST SERPL-CCNC: 15 U/L (ref 10–35)
BILIRUB SERPL-MCNC: 0.6 MG/DL (ref 0.2–1.2)
BUN SERPL-MCNC: 18 MG/DL (ref 7–25)
CALCIUM SERPL-MCNC: 9.6 MG/DL (ref 8.6–10.4)
CHLORIDE SERPL-SCNC: 101 MMOL/L (ref 98–110)
CHOLEST SERPL-MCNC: 193 MG/DL
CHOLEST/HDLC SERPL: 3.1 (CALC)
CO2 SERPL-SCNC: 29 MMOL/L (ref 20–32)
CREAT SERPL-MCNC: 1.02 MG/DL (ref 0.6–1)
CREAT UR-MCNC: 78 MG/DL (ref 20–275)
EGFRCR SERPLBLD CKD-EPI 2021: 57 ML/MIN/1.73M2
ERYTHROCYTE [DISTWIDTH] IN BLOOD BY AUTOMATED COUNT: 13.3 % (ref 11–15)
EST. AVERAGE GLUCOSE BLD GHB EST-MCNC: 137 MG/DL
EST. AVERAGE GLUCOSE BLD GHB EST-SCNC: 7.6 MMOL/L
GLUCOSE SERPL-MCNC: 116 MG/DL (ref 65–99)
HBA1C MFR BLD: 6.4 % OF TOTAL HGB
HCT VFR BLD AUTO: 46.8 % (ref 35–45)
HDLC SERPL-MCNC: 62 MG/DL
HGB BLD-MCNC: 15.7 G/DL (ref 11.7–15.5)
LDLC SERPL CALC-MCNC: 99 MG/DL (CALC)
LDLC SERPL DIRECT ASSAY-MCNC: 106 MG/DL
MCH RBC QN AUTO: 31.5 PG (ref 27–33)
MCHC RBC AUTO-ENTMCNC: 33.5 G/DL (ref 32–36)
MCV RBC AUTO: 94 FL (ref 80–100)
MICROALBUMIN UR-MCNC: <0.2 MG/DL
NONHDLC SERPL-MCNC: 131 MG/DL (CALC)
PLATELET # BLD AUTO: 244 THOUSAND/UL (ref 140–400)
PMV BLD REES-ECKER: 11.2 FL (ref 7.5–12.5)
POTASSIUM SERPL-SCNC: 3.6 MMOL/L (ref 3.5–5.3)
PROT SERPL-MCNC: 7.2 G/DL (ref 6.1–8.1)
RBC # BLD AUTO: 4.98 MILLION/UL (ref 3.8–5.1)
SODIUM SERPL-SCNC: 139 MMOL/L (ref 135–146)
TRIGL SERPL-MCNC: 196 MG/DL
TSH SERPL-ACNC: 2.89 MIU/L (ref 0.4–4.5)
VIT B12 SERPL-MCNC: 1018 PG/ML (ref 200–1100)
WBC # BLD AUTO: 7.6 THOUSAND/UL (ref 3.8–10.8)

## 2025-03-20 PROCEDURE — RXMED WILLOW AMBULATORY MEDICATION CHARGE

## 2025-03-21 ENCOUNTER — PHARMACY VISIT (OUTPATIENT)
Dept: PHARMACY | Facility: CLINIC | Age: 75
End: 2025-03-21
Payer: MEDICARE

## 2025-04-06 DIAGNOSIS — E11.65 TYPE 2 DIABETES MELLITUS WITH HYPERGLYCEMIA, WITHOUT LONG-TERM CURRENT USE OF INSULIN: ICD-10-CM

## 2025-04-06 PROCEDURE — RXMED WILLOW AMBULATORY MEDICATION CHARGE

## 2025-04-07 ENCOUNTER — PHARMACY VISIT (OUTPATIENT)
Dept: PHARMACY | Facility: CLINIC | Age: 75
End: 2025-04-07
Payer: MEDICARE

## 2025-04-07 PROCEDURE — RXMED WILLOW AMBULATORY MEDICATION CHARGE

## 2025-04-07 RX ORDER — GLIMEPIRIDE 1 MG/1
1 TABLET ORAL
Qty: 90 TABLET | Refills: 1 | Status: SHIPPED | OUTPATIENT
Start: 2025-04-07 | End: 2025-10-04

## 2025-04-09 ENCOUNTER — PHARMACY VISIT (OUTPATIENT)
Dept: PHARMACY | Facility: CLINIC | Age: 75
End: 2025-04-09
Payer: MEDICARE

## 2025-05-06 PROCEDURE — RXMED WILLOW AMBULATORY MEDICATION CHARGE

## 2025-05-08 ENCOUNTER — PHARMACY VISIT (OUTPATIENT)
Dept: PHARMACY | Facility: CLINIC | Age: 75
End: 2025-05-08
Payer: MEDICARE

## 2025-05-30 ENCOUNTER — PHARMACY VISIT (OUTPATIENT)
Dept: PHARMACY | Facility: CLINIC | Age: 75
End: 2025-05-30
Payer: MEDICARE

## 2025-05-30 PROCEDURE — RXMED WILLOW AMBULATORY MEDICATION CHARGE

## 2025-05-30 RX ORDER — PREDNISOLONE ACETATE 10 MG/ML
SUSPENSION/ DROPS OPHTHALMIC
Qty: 5 ML | Refills: 0 | OUTPATIENT
Start: 2025-05-30

## 2025-06-04 PROCEDURE — RXMED WILLOW AMBULATORY MEDICATION CHARGE

## 2025-06-09 ENCOUNTER — PHARMACY VISIT (OUTPATIENT)
Dept: PHARMACY | Facility: CLINIC | Age: 75
End: 2025-06-09
Payer: MEDICARE

## 2025-06-09 ENCOUNTER — APPOINTMENT (OUTPATIENT)
Dept: PHARMACY | Facility: HOSPITAL | Age: 75
End: 2025-06-09
Payer: MEDICARE

## 2025-06-09 DIAGNOSIS — N18.31 CHRONIC KIDNEY DISEASE, STAGE 3A (MULTI): Primary | ICD-10-CM

## 2025-06-09 DIAGNOSIS — E11.65 TYPE 2 DIABETES MELLITUS WITH HYPERGLYCEMIA, WITHOUT LONG-TERM CURRENT USE OF INSULIN: ICD-10-CM

## 2025-06-09 PROCEDURE — RXMED WILLOW AMBULATORY MEDICATION CHARGE

## 2025-06-09 NOTE — ASSESSMENT & PLAN NOTE
Patient's diabetes is controlled with an A1c of 6.4% (goal <7%).    At 3-mo follow up, patient continues to do well. Sugars are consistently well controlled. She does express that she sometimes has mild yeast infections on the outside of her vaginal folds. Treats by stopping Jardiance until it clears up. Has only happened twice since decreasing the dose. She does not want to stop the medication.     Plan:  CONTINUE glimepiride, metformin and Jardiance as prescribed.

## 2025-06-09 NOTE — PROGRESS NOTES
Clinical Pharmacy Appointment    Patient ID: Irene Jett is a 75 y.o. female who presents for Diabetes and Chronic Kidney Disease.    Pt is here for Follow Up     Referring Provider: Reanna Chang MD  PCP: Reanna Payne MD   Last visit with PCP: 3/10/25  Next visit with PCP: 8/15/25      Subjective     Interval History  Last visit medications were kept the same d/t controlled blood sugars     HPI  Type II Diabetes  Current  Pharmacotherapy:   Jardiance 10 mg daily  Glimepiride 1 mg with breakfast  Metformin 500 mg with lunch     SECONDARY PREVENTION  - Statin? Rosuvastatin 10 mg   LDL: 97   TC: 186  - ACE-I/ARB? Lisinopril 40 mg   UACR: 0 (9/2024)   BP: 134/82  - Aspirin? No    -The 10-year ASCVD risk score (Do CASTRO, et al., 2019) is: 47.1%    Values used to calculate the score:      Age: 75 years      Sex: Female      Is Non- : No      Diabetic: Yes      Tobacco smoker: No      Systolic Blood Pressure: 134 mmHg      Is BP treated: Yes      HDL Cholesterol: 62 mg/dL      Total Cholesterol: 193 mg/dL      Current monitoring regimen:   Patient is using: finger sticks     SMBG Fasting Readings:   - low 100s, some in 130's  - 160 1 or 2 times   - She is unsure what caused this; thinks possibly from putting lotion on then checking BGs    Hypoglycemia? Sometimes feel lightheaded in morning but once she eats she feels better    Adverse Reactions: None; Not as many yeast infections as on Jardiance 25 mg (some external)      Objective   Allergies   Allergen Reactions    Ozempic [Semaglutide] Constipation    Sitagliptin Swelling and Other    Latex Rash and Unknown     Social History     Social History Narrative    Not on file      Medication Review  Current Outpatient Medications   Medication Instructions    blood sugar diagnostic (OneTouch Ultra Test) strip Use to test blood sugars once daily in the morning before breakfast.    blood-glucose meter misc Use to test sugars once  daily    cyanocobalamin, vitamin B-12, 200 mcg/spray spray,suspension 1 spray, sublingual, Daily    empagliflozin (JARDIANCE) 10 mg, oral, Daily    glimepiride (AMARYL) 1 mg, oral, Daily before breakfast    hydroCHLOROthiazide (MICROZIDE) 12.5 mg, oral, Daily    lancets 33 gauge misc Use to test blood sugars once daily    lisinopril 40 mg, oral, Daily    metFORMIN (GLUCOPHAGE) 500 mg, oral, Daily    prednisoLONE acetate (Pred Forte) 1 % ophthalmic suspension Instill 1 drop into RIGHT  EYE ONLY 4 times a day for 5 days      Vitals  BP Readings from Last 2 Encounters:   09/09/24 134/82   06/10/24 120/74     BMI Readings from Last 1 Encounters:   10/15/24 28.27 kg/m²      Labs  A1C  Lab Results   Component Value Date    HGBA1C 6.4 (H) 03/18/2025    HGBA1C 7.4 (H) 09/03/2024    HGBA1C 7.6 (H) 06/07/2024     BMP  Lab Results   Component Value Date    CALCIUM 9.6 03/18/2025     03/18/2025    K 3.6 03/18/2025    CO2 29 03/18/2025     03/18/2025    BUN 18 03/18/2025    CREATININE 1.02 (H) 03/18/2025    EGFR 57 (L) 03/18/2025     LFTs  Lab Results   Component Value Date    ALT 14 03/18/2025    AST 15 03/18/2025    ALKPHOS 60 03/18/2025    BILITOT 0.6 03/18/2025     FLP  Lab Results   Component Value Date    TRIG 196 (H) 03/18/2025    CHOL 193 03/18/2025    LDLF 78 08/21/2023    LDLCALC 99 03/18/2025    HDL 62 03/18/2025     Urine Microalbumin  Lab Results   Component Value Date    MICROALBCREA NOTE 03/18/2025     Weight Management  Wt Readings from Last 3 Encounters:   10/15/24 89.4 kg (197 lb)   09/09/24 89.4 kg (197 lb)   06/10/24 90.7 kg (200 lb)      There is no height or weight on file to calculate BMI.     Assessment/Plan   Problem List Items Addressed This Visit       Chronic kidney disease, stage 3a (Multi) - Primary    Patient's most recent eGFR was 57. Based on this, all medications are properly dose adjusted based on renal function.   Patient recently started on SGLT-2 (Jardiance 10 mg) per  recommendations for CKD management. She is tolerating well at this time.     Plan:  CONTINUE Jardiance 10 mg daily          Type 2 diabetes mellitus with hyperglycemia, without long-term current use of insulin    Patient's diabetes is controlled with an A1c of 6.4% (goal <7%).    At 3-mo follow up, patient continues to do well. Sugars are consistently well controlled. She does express that she sometimes has mild yeast infections on the outside of her vaginal folds. Treats by stopping Jardiance until it clears up. Has only happened twice since decreasing the dose. She does not want to stop the medication.     Plan:  CONTINUE glimepiride, metformin and Jardiance as prescribed.          Relevant Medications    empagliflozin (Jardiance) 10 mg tablet         Clinical Pharmacist follow-up: as needed, Telehealth visit    Continue all meds under the continuation of care with the referring provider and clinical pharmacy team.    Thank you,  Rosanne Rachel, PharmD  Clinical Pharmacy Specialist     Verbal consent to manage patient's drug therapy was obtained from the patient. They were informed they may decline to participate or withdraw from participation in pharmacy services at any time.

## 2025-06-09 NOTE — ASSESSMENT & PLAN NOTE
Patient's most recent eGFR was 57. Based on this, all medications are properly dose adjusted based on renal function.   Patient recently started on SGLT-2 (Jardiance 10 mg) per recommendations for CKD management. She is tolerating well at this time.     Plan:  CONTINUE Jardiance 10 mg daily

## 2025-06-12 ENCOUNTER — PHARMACY VISIT (OUTPATIENT)
Dept: PHARMACY | Facility: CLINIC | Age: 75
End: 2025-06-12
Payer: MEDICARE

## 2025-07-01 PROCEDURE — RXMED WILLOW AMBULATORY MEDICATION CHARGE

## 2025-07-08 ENCOUNTER — PHARMACY VISIT (OUTPATIENT)
Dept: PHARMACY | Facility: CLINIC | Age: 75
End: 2025-07-08
Payer: MEDICARE

## 2025-07-11 PROCEDURE — RXMED WILLOW AMBULATORY MEDICATION CHARGE

## 2025-07-14 ENCOUNTER — PHARMACY VISIT (OUTPATIENT)
Dept: PHARMACY | Facility: CLINIC | Age: 75
End: 2025-07-14
Payer: MEDICARE

## 2025-07-30 PROCEDURE — RXMED WILLOW AMBULATORY MEDICATION CHARGE

## 2025-08-01 ENCOUNTER — PHARMACY VISIT (OUTPATIENT)
Dept: PHARMACY | Facility: CLINIC | Age: 75
End: 2025-08-01
Payer: MEDICARE

## 2025-08-09 ENCOUNTER — LAB (OUTPATIENT)
Dept: LAB | Facility: HOSPITAL | Age: 75
End: 2025-08-09
Payer: MEDICARE

## 2025-08-10 LAB
ALBUMIN SERPL-MCNC: 4.6 G/DL (ref 3.6–5.1)
ALP SERPL-CCNC: 58 U/L (ref 37–153)
ALT SERPL-CCNC: 14 U/L (ref 6–29)
ANION GAP SERPL CALCULATED.4IONS-SCNC: 8 MMOL/L (CALC) (ref 7–17)
AST SERPL-CCNC: 15 U/L (ref 10–35)
BILIRUB SERPL-MCNC: 0.5 MG/DL (ref 0.2–1.2)
BUN SERPL-MCNC: 23 MG/DL (ref 7–25)
CALCIUM SERPL-MCNC: 9.7 MG/DL (ref 8.6–10.4)
CHLORIDE SERPL-SCNC: 105 MMOL/L (ref 98–110)
CHOLEST SERPL-MCNC: 174 MG/DL
CHOLEST/HDLC SERPL: 2.6 (CALC)
CO2 SERPL-SCNC: 26 MMOL/L (ref 20–32)
CREAT SERPL-MCNC: 1.01 MG/DL (ref 0.6–1)
EGFRCR SERPLBLD CKD-EPI 2021: 57 ML/MIN/1.73M2
ERYTHROCYTE [DISTWIDTH] IN BLOOD BY AUTOMATED COUNT: 13.3 % (ref 11–15)
EST. AVERAGE GLUCOSE BLD GHB EST-MCNC: 151 MG/DL
EST. AVERAGE GLUCOSE BLD GHB EST-SCNC: 8.4 MMOL/L
GLUCOSE SERPL-MCNC: 157 MG/DL (ref 65–99)
HBA1C MFR BLD: 6.9 %
HCT VFR BLD AUTO: 44.5 % (ref 35–45)
HDLC SERPL-MCNC: 67 MG/DL
HGB BLD-MCNC: 14.7 G/DL (ref 11.7–15.5)
LDLC SERPL CALC-MCNC: 81 MG/DL (CALC)
LDLC SERPL DIRECT ASSAY-MCNC: 97 MG/DL
MCH RBC QN AUTO: 31.3 PG (ref 27–33)
MCHC RBC AUTO-ENTMCNC: 33 G/DL (ref 32–36)
MCV RBC AUTO: 94.9 FL (ref 80–100)
NONHDLC SERPL-MCNC: 107 MG/DL (CALC)
PLATELET # BLD AUTO: 226 THOUSAND/UL (ref 140–400)
PMV BLD REES-ECKER: 11.4 FL (ref 7.5–12.5)
POTASSIUM SERPL-SCNC: 4.8 MMOL/L (ref 3.5–5.3)
PROT SERPL-MCNC: 7.3 G/DL (ref 6.1–8.1)
RBC # BLD AUTO: 4.69 MILLION/UL (ref 3.8–5.1)
SODIUM SERPL-SCNC: 139 MMOL/L (ref 135–146)
TRIGL SERPL-MCNC: 160 MG/DL
WBC # BLD AUTO: 7.3 THOUSAND/UL (ref 3.8–10.8)

## 2025-08-11 PROBLEM — Z00.00 ANNUAL PHYSICAL EXAM: Status: ACTIVE | Noted: 2025-08-11

## 2025-08-11 PROBLEM — R79.89 ELEVATED TSH: Status: RESOLVED | Noted: 2024-02-26 | Resolved: 2025-08-11

## 2025-08-15 ENCOUNTER — APPOINTMENT (OUTPATIENT)
Dept: PRIMARY CARE | Facility: CLINIC | Age: 75
End: 2025-08-15
Payer: MEDICARE

## 2025-08-15 VITALS
DIASTOLIC BLOOD PRESSURE: 73 MMHG | OXYGEN SATURATION: 95 % | TEMPERATURE: 97.2 F | HEIGHT: 70 IN | SYSTOLIC BLOOD PRESSURE: 115 MMHG | BODY MASS INDEX: 29.58 KG/M2 | WEIGHT: 206.6 LBS | HEART RATE: 81 BPM

## 2025-08-15 DIAGNOSIS — E11.65 TYPE 2 DIABETES MELLITUS WITH HYPERGLYCEMIA, WITHOUT LONG-TERM CURRENT USE OF INSULIN: ICD-10-CM

## 2025-08-15 DIAGNOSIS — Z12.31 ENCOUNTER FOR SCREENING MAMMOGRAM FOR MALIGNANT NEOPLASM OF BREAST: ICD-10-CM

## 2025-08-15 DIAGNOSIS — N18.31 CHRONIC KIDNEY DISEASE, STAGE 3A (MULTI): ICD-10-CM

## 2025-08-15 DIAGNOSIS — E78.1 ESSENTIAL HYPERTRIGLYCERIDEMIA: ICD-10-CM

## 2025-08-15 DIAGNOSIS — Z00.00 MEDICARE ANNUAL WELLNESS VISIT, SUBSEQUENT: Primary | ICD-10-CM

## 2025-08-15 DIAGNOSIS — Z00.00 ANNUAL PHYSICAL EXAM: ICD-10-CM

## 2025-08-15 DIAGNOSIS — Z12.39 BREAST SCREENING: ICD-10-CM

## 2025-08-15 DIAGNOSIS — I10 ESSENTIAL HYPERTENSION: ICD-10-CM

## 2025-08-15 PROCEDURE — 1170F FXNL STATUS ASSESSED: CPT | Performed by: FAMILY MEDICINE

## 2025-08-15 PROCEDURE — 99397 PER PM REEVAL EST PAT 65+ YR: CPT | Performed by: FAMILY MEDICINE

## 2025-08-15 PROCEDURE — 1159F MED LIST DOCD IN RCRD: CPT | Performed by: FAMILY MEDICINE

## 2025-08-15 PROCEDURE — 3078F DIAST BP <80 MM HG: CPT | Performed by: FAMILY MEDICINE

## 2025-08-15 PROCEDURE — 1123F ACP DISCUSS/DSCN MKR DOCD: CPT | Performed by: FAMILY MEDICINE

## 2025-08-15 PROCEDURE — 1160F RVW MEDS BY RX/DR IN RCRD: CPT | Performed by: FAMILY MEDICINE

## 2025-08-15 PROCEDURE — 3074F SYST BP LT 130 MM HG: CPT | Performed by: FAMILY MEDICINE

## 2025-08-15 PROCEDURE — G0439 PPPS, SUBSEQ VISIT: HCPCS | Performed by: FAMILY MEDICINE

## 2025-08-15 PROCEDURE — G0444 DEPRESSION SCREEN ANNUAL: HCPCS | Performed by: FAMILY MEDICINE

## 2025-08-15 PROCEDURE — 1157F ADVNC CARE PLAN IN RCRD: CPT | Performed by: FAMILY MEDICINE

## 2025-08-15 PROCEDURE — 99214 OFFICE O/P EST MOD 30 MIN: CPT | Performed by: FAMILY MEDICINE

## 2025-08-15 PROCEDURE — 1158F ADVNC CARE PLAN TLK DOCD: CPT | Performed by: FAMILY MEDICINE

## 2025-08-15 RX ORDER — METFORMIN HYDROCHLORIDE 500 MG/1
500 TABLET ORAL
Qty: 180 TABLET | Refills: 1 | Status: SHIPPED | OUTPATIENT
Start: 2025-08-15 | End: 2026-08-15

## 2025-08-15 RX ORDER — LISINOPRIL 40 MG/1
40 TABLET ORAL DAILY
Qty: 90 TABLET | Refills: 3 | Status: SHIPPED | OUTPATIENT
Start: 2025-08-15 | End: 2026-08-15

## 2025-08-15 RX ORDER — HYDROCHLOROTHIAZIDE 12.5 MG/1
12.5 TABLET ORAL DAILY
Qty: 90 TABLET | Refills: 3 | Status: SHIPPED | OUTPATIENT
Start: 2025-08-15

## 2025-08-15 RX ORDER — METFORMIN HYDROCHLORIDE 500 MG/1
500 TABLET ORAL DAILY
Qty: 180 TABLET | Refills: 1 | Status: SHIPPED | OUTPATIENT
Start: 2025-08-15 | End: 2025-08-15 | Stop reason: SDUPTHER

## 2025-08-15 ASSESSMENT — PATIENT HEALTH QUESTIONNAIRE - PHQ9
2. FEELING DOWN, DEPRESSED OR HOPELESS: NOT AT ALL
SUM OF ALL RESPONSES TO PHQ9 QUESTIONS 1 AND 2: 0
1. LITTLE INTEREST OR PLEASURE IN DOING THINGS: NOT AT ALL

## 2025-08-15 ASSESSMENT — ACTIVITIES OF DAILY LIVING (ADL)
DOING_HOUSEWORK: INDEPENDENT
BATHING: INDEPENDENT
TAKING_MEDICATION: INDEPENDENT
GROCERY_SHOPPING: INDEPENDENT
MANAGING_FINANCES: INDEPENDENT
DRESSING: INDEPENDENT

## 2025-08-15 ASSESSMENT — ENCOUNTER SYMPTOMS
LOSS OF SENSATION IN FEET: 0
DEPRESSION: 0
OCCASIONAL FEELINGS OF UNSTEADINESS: 0

## 2025-08-18 LAB
ALBUMIN/CREAT UR: NORMAL MG/G CREAT
CREAT UR-MCNC: 34 MG/DL (ref 20–275)
MICROALBUMIN UR-MCNC: <0.2 MG/DL

## 2025-08-20 ENCOUNTER — TELEMEDICINE (OUTPATIENT)
Dept: PHARMACY | Facility: HOSPITAL | Age: 75
End: 2025-08-20
Payer: MEDICARE

## 2025-08-20 DIAGNOSIS — N18.31 CHRONIC KIDNEY DISEASE, STAGE 3A (MULTI): ICD-10-CM

## 2025-08-20 DIAGNOSIS — E11.65 TYPE 2 DIABETES MELLITUS WITH HYPERGLYCEMIA, WITHOUT LONG-TERM CURRENT USE OF INSULIN: Primary | ICD-10-CM

## 2025-08-20 PROCEDURE — RXMED WILLOW AMBULATORY MEDICATION CHARGE

## 2025-08-20 RX ORDER — BLOOD-GLUCOSE SENSOR
EACH MISCELLANEOUS
Qty: 2 EACH | Refills: 0 | Status: SHIPPED | OUTPATIENT
Start: 2025-08-20

## 2025-08-22 ENCOUNTER — PHARMACY VISIT (OUTPATIENT)
Dept: PHARMACY | Facility: CLINIC | Age: 75
End: 2025-08-22
Payer: MEDICARE

## 2025-08-26 ENCOUNTER — PHARMACY VISIT (OUTPATIENT)
Dept: PHARMACY | Facility: CLINIC | Age: 75
End: 2025-08-26
Payer: MEDICARE

## 2025-08-26 ENCOUNTER — TELEPHONE (OUTPATIENT)
Dept: PRIMARY CARE | Facility: CLINIC | Age: 75
End: 2025-08-26
Payer: MEDICARE

## 2025-08-26 DIAGNOSIS — B37.9 YEAST INFECTION: Primary | ICD-10-CM

## 2025-08-26 PROCEDURE — RXMED WILLOW AMBULATORY MEDICATION CHARGE

## 2025-08-26 RX ORDER — FLUCONAZOLE 150 MG/1
150 TABLET ORAL ONCE
Qty: 1 TABLET | Refills: 2 | Status: SHIPPED | OUTPATIENT
Start: 2025-08-26 | End: 2025-08-27

## 2025-09-02 PROCEDURE — RXMED WILLOW AMBULATORY MEDICATION CHARGE

## 2025-09-05 ENCOUNTER — PHARMACY VISIT (OUTPATIENT)
Dept: PHARMACY | Facility: CLINIC | Age: 75
End: 2025-09-05
Payer: MEDICARE

## 2025-09-17 ENCOUNTER — APPOINTMENT (OUTPATIENT)
Dept: PHARMACY | Facility: HOSPITAL | Age: 75
End: 2025-09-17
Payer: MEDICARE

## 2026-02-20 ENCOUNTER — APPOINTMENT (OUTPATIENT)
Dept: PRIMARY CARE | Facility: CLINIC | Age: 76
End: 2026-02-20
Payer: MEDICARE